# Patient Record
Sex: MALE | Race: WHITE | NOT HISPANIC OR LATINO | Employment: FULL TIME | URBAN - METROPOLITAN AREA
[De-identification: names, ages, dates, MRNs, and addresses within clinical notes are randomized per-mention and may not be internally consistent; named-entity substitution may affect disease eponyms.]

---

## 2023-04-05 ENCOUNTER — TELEPHONE (OUTPATIENT)
Dept: PRIMARY CARE | Facility: CLINIC | Age: 63
End: 2023-04-05

## 2023-04-05 LAB
ALANINE AMINOTRANSFERASE (SGPT) (U/L) IN SER/PLAS: 18 U/L (ref 10–52)
ALBUMIN (G/DL) IN SER/PLAS: 4.7 G/DL (ref 3.4–5)
ALBUMIN (MG/L) IN URINE: 10.1 MG/L
ALBUMIN/CREATININE (UG/MG) IN URINE: 19.4 UG/MG CRT (ref 0–30)
ALKALINE PHOSPHATASE (U/L) IN SER/PLAS: 50 U/L (ref 33–136)
ANION GAP IN SER/PLAS: 15 MMOL/L (ref 10–20)
APPEARANCE, URINE: CLEAR
ASPARTATE AMINOTRANSFERASE (SGOT) (U/L) IN SER/PLAS: 14 U/L (ref 9–39)
BILIRUBIN TOTAL (MG/DL) IN SER/PLAS: 0.7 MG/DL (ref 0–1.2)
BILIRUBIN, URINE: NEGATIVE
BLOOD, URINE: NEGATIVE
CALCIUM (MG/DL) IN SER/PLAS: 10.2 MG/DL (ref 8.6–10.6)
CARBON DIOXIDE, TOTAL (MMOL/L) IN SER/PLAS: 28 MMOL/L (ref 21–32)
CHLORIDE (MMOL/L) IN SER/PLAS: 100 MMOL/L (ref 98–107)
CHOLESTEROL (MG/DL) IN SER/PLAS: 130 MG/DL (ref 0–199)
CHOLESTEROL IN HDL (MG/DL) IN SER/PLAS: 52.2 MG/DL
CHOLESTEROL/HDL RATIO: 2.5
COLOR, URINE: ABNORMAL
CREATININE (MG/DL) IN SER/PLAS: 0.82 MG/DL (ref 0.5–1.3)
CREATININE (MG/DL) IN URINE: 52 MG/DL (ref 20–370)
ERYTHROCYTE DISTRIBUTION WIDTH (RATIO) BY AUTOMATED COUNT: 13.1 % (ref 11.5–14.5)
ERYTHROCYTE MEAN CORPUSCULAR HEMOGLOBIN CONCENTRATION (G/DL) BY AUTOMATED: 31.3 G/DL (ref 32–36)
ERYTHROCYTE MEAN CORPUSCULAR VOLUME (FL) BY AUTOMATED COUNT: 94 FL (ref 80–100)
ERYTHROCYTES (10*6/UL) IN BLOOD BY AUTOMATED COUNT: 5.7 X10E12/L (ref 4.5–5.9)
ESTIMATED AVERAGE GLUCOSE FOR HBA1C: 180 MG/DL
GFR MALE: >90 ML/MIN/1.73M2
GLUCOSE (MG/DL) IN SER/PLAS: 130 MG/DL (ref 74–99)
GLUCOSE, URINE: ABNORMAL MG/DL
HEMATOCRIT (%) IN BLOOD BY AUTOMATED COUNT: 53.3 % (ref 41–52)
HEMOGLOBIN (G/DL) IN BLOOD: 16.7 G/DL (ref 13.5–17.5)
HEMOGLOBIN A1C/HEMOGLOBIN TOTAL IN BLOOD: 7.9 %
KETONES, URINE: ABNORMAL MG/DL
LDL: 66 MG/DL (ref 0–99)
LEUKOCYTE ESTERASE, URINE: NEGATIVE
LEUKOCYTES (10*3/UL) IN BLOOD BY AUTOMATED COUNT: 7 X10E9/L (ref 4.4–11.3)
NITRITE, URINE: NEGATIVE
NRBC (PER 100 WBCS) BY AUTOMATED COUNT: 0 /100 WBC (ref 0–0)
PH, URINE: 5 (ref 5–8)
PLATELETS (10*3/UL) IN BLOOD AUTOMATED COUNT: 143 X10E9/L (ref 150–450)
POTASSIUM (MMOL/L) IN SER/PLAS: 4.7 MMOL/L (ref 3.5–5.3)
PROSTATE SPECIFIC AG (NG/ML) IN SER/PLAS: 0.54 NG/ML (ref 0–4)
PROTEIN TOTAL: 7.5 G/DL (ref 6.4–8.2)
PROTEIN, URINE: NEGATIVE MG/DL
SODIUM (MMOL/L) IN SER/PLAS: 138 MMOL/L (ref 136–145)
SPECIFIC GRAVITY, URINE: 1.03 (ref 1–1.03)
TRIGLYCERIDE (MG/DL) IN SER/PLAS: 61 MG/DL (ref 0–149)
UREA NITROGEN (MG/DL) IN SER/PLAS: 29 MG/DL (ref 6–23)
UROBILINOGEN, URINE: <2 MG/DL (ref 0–1.9)
VLDL: 12 MG/DL (ref 0–40)

## 2023-04-06 NOTE — TELEPHONE ENCOUNTER
----- Message from Tony Miranda MD sent at 4/5/2023  9:28 PM EDT -----  Low PLT count is stable at baseline. FBS and A1c are higher in uncontrolled diabetes range. Other labs look good.   ----- Message -----  From: [email or web address] - Lab Results In  Sent: 4/5/2023   8:43 PM EDT  To: Tony Miranda MD

## 2023-04-07 ENCOUNTER — OFFICE VISIT (OUTPATIENT)
Dept: PRIMARY CARE | Facility: CLINIC | Age: 63
End: 2023-04-07
Payer: COMMERCIAL

## 2023-04-07 VITALS
HEART RATE: 60 BPM | OXYGEN SATURATION: 95 % | RESPIRATION RATE: 12 BRPM | SYSTOLIC BLOOD PRESSURE: 136 MMHG | DIASTOLIC BLOOD PRESSURE: 73 MMHG | HEIGHT: 73 IN | BODY MASS INDEX: 29.95 KG/M2 | WEIGHT: 226 LBS | TEMPERATURE: 97.5 F

## 2023-04-07 DIAGNOSIS — I25.10 CORONARY ARTERY DISEASE INVOLVING NATIVE CORONARY ARTERY OF NATIVE HEART WITHOUT ANGINA PECTORIS: ICD-10-CM

## 2023-04-07 DIAGNOSIS — E78.2 MIXED DYSLIPIDEMIA: ICD-10-CM

## 2023-04-07 DIAGNOSIS — K21.9 GASTROESOPHAGEAL REFLUX DISEASE WITHOUT ESOPHAGITIS: ICD-10-CM

## 2023-04-07 DIAGNOSIS — I10 HTN (HYPERTENSION), BENIGN: ICD-10-CM

## 2023-04-07 DIAGNOSIS — G47.33 OSA ON CPAP: ICD-10-CM

## 2023-04-07 DIAGNOSIS — E11.69 DM TYPE 2 WITH DIABETIC DYSLIPIDEMIA (MULTI): Primary | ICD-10-CM

## 2023-04-07 DIAGNOSIS — E78.5 DM TYPE 2 WITH DIABETIC DYSLIPIDEMIA (MULTI): Primary | ICD-10-CM

## 2023-04-07 PROCEDURE — 1036F TOBACCO NON-USER: CPT | Performed by: FAMILY MEDICINE

## 2023-04-07 PROCEDURE — 99214 OFFICE O/P EST MOD 30 MIN: CPT | Performed by: FAMILY MEDICINE

## 2023-04-07 PROCEDURE — 3078F DIAST BP <80 MM HG: CPT | Performed by: FAMILY MEDICINE

## 2023-04-07 PROCEDURE — 3075F SYST BP GE 130 - 139MM HG: CPT | Performed by: FAMILY MEDICINE

## 2023-04-07 PROCEDURE — 3051F HG A1C>EQUAL 7.0%<8.0%: CPT | Performed by: FAMILY MEDICINE

## 2023-04-07 PROCEDURE — 4010F ACE/ARB THERAPY RXD/TAKEN: CPT | Performed by: FAMILY MEDICINE

## 2023-04-07 RX ORDER — METFORMIN HYDROCHLORIDE 500 MG/1
TABLET, EXTENDED RELEASE ORAL
COMMUNITY
End: 2023-04-07 | Stop reason: SDUPTHER

## 2023-04-07 RX ORDER — METFORMIN HYDROCHLORIDE 500 MG/1
2000 TABLET, EXTENDED RELEASE ORAL
Qty: 360 TABLET | Refills: 0 | Status: SHIPPED | OUTPATIENT
Start: 2023-04-07 | End: 2023-07-14 | Stop reason: SDUPTHER

## 2023-04-07 RX ORDER — OMEPRAZOLE 40 MG/1
CAPSULE, DELAYED RELEASE ORAL
COMMUNITY
Start: 2023-04-05 | End: 2023-04-07 | Stop reason: SDUPTHER

## 2023-04-07 RX ORDER — METOPROLOL TARTRATE 100 MG/1
100 TABLET ORAL 2 TIMES DAILY
Qty: 180 TABLET | Refills: 0 | Status: SHIPPED | OUTPATIENT
Start: 2023-04-07 | End: 2023-07-14 | Stop reason: SDUPTHER

## 2023-04-07 RX ORDER — LOSARTAN POTASSIUM 50 MG/1
50 TABLET ORAL DAILY
COMMUNITY
End: 2023-04-07 | Stop reason: SDUPTHER

## 2023-04-07 RX ORDER — BLOOD-GLUCOSE METER
EACH MISCELLANEOUS
COMMUNITY

## 2023-04-07 RX ORDER — METOPROLOL TARTRATE 100 MG/1
1 TABLET ORAL 2 TIMES DAILY
COMMUNITY
Start: 2022-06-30 | End: 2023-04-07 | Stop reason: SDUPTHER

## 2023-04-07 RX ORDER — ASPIRIN 81 MG/1
1 TABLET ORAL DAILY
COMMUNITY
Start: 2015-03-18

## 2023-04-07 RX ORDER — EMPAGLIFLOZIN 25 MG/1
25 TABLET, FILM COATED ORAL DAILY
Qty: 90 TABLET | Refills: 0 | Status: SHIPPED | OUTPATIENT
Start: 2023-04-07 | End: 2023-07-14 | Stop reason: SDUPTHER

## 2023-04-07 RX ORDER — LOSARTAN POTASSIUM 50 MG/1
50 TABLET ORAL DAILY
Qty: 90 TABLET | Refills: 0 | Status: SHIPPED | OUTPATIENT
Start: 2023-04-07 | End: 2023-07-14 | Stop reason: SDUPTHER

## 2023-04-07 RX ORDER — ROSUVASTATIN CALCIUM 20 MG/1
20 TABLET, COATED ORAL DAILY
Qty: 90 TABLET | Refills: 0 | Status: SHIPPED | OUTPATIENT
Start: 2023-04-07 | End: 2023-07-14 | Stop reason: SDUPTHER

## 2023-04-07 RX ORDER — EMPAGLIFLOZIN 25 MG/1
25 TABLET, FILM COATED ORAL DAILY
COMMUNITY
End: 2023-04-07 | Stop reason: SDUPTHER

## 2023-04-07 RX ORDER — ROSUVASTATIN CALCIUM 20 MG/1
20 TABLET, COATED ORAL DAILY
COMMUNITY
End: 2023-04-07 | Stop reason: SDUPTHER

## 2023-04-07 RX ORDER — HYDROCORTISONE 1 %
CREAM (GRAM) TOPICAL
COMMUNITY
Start: 2021-02-11

## 2023-04-07 RX ORDER — OMEPRAZOLE 40 MG/1
40 CAPSULE, DELAYED RELEASE ORAL
Qty: 90 CAPSULE | Refills: 0 | Status: SHIPPED | OUTPATIENT
Start: 2023-04-07 | End: 2023-07-14 | Stop reason: SDUPTHER

## 2023-04-07 ASSESSMENT — ENCOUNTER SYMPTOMS
COUGH: 0
BRUISES/BLEEDS EASILY: 0
SORE THROAT: 0
EYE REDNESS: 0
ABDOMINAL PAIN: 0
WEAKNESS: 0
CHILLS: 0
FATIGUE: 0
HEADACHES: 0
DYSURIA: 0
BACK PAIN: 0
FEVER: 0
DIZZINESS: 0
APPETITE CHANGE: 0
CONSTIPATION: 0
EYE PAIN: 0
NERVOUS/ANXIOUS: 0
ADENOPATHY: 0
ABDOMINAL DISTENTION: 0
SHORTNESS OF BREATH: 0
BLOOD IN STOOL: 0
DYSPHORIC MOOD: 0
CHEST TIGHTNESS: 0
ARTHRALGIAS: 0
DIFFICULTY URINATING: 0
DIARRHEA: 0

## 2023-04-07 NOTE — PROGRESS NOTES
"Subjective   Patient ID: Ryan Monreal is a 62 y.o. male who presents for Follow-up.    HPI     Review of Systems    Objective   /73   Pulse 60   Temp 36.4 °C (97.5 °F)   Resp 12   Ht 1.854 m (6' 1\")   Wt 103 kg (226 lb)   SpO2 95%   BMI 29.82 kg/m²     Physical Exam    Assessment/Plan          "

## 2023-04-07 NOTE — PROGRESS NOTES
Subjective   Patient ID: Ryan Monreal is a 62 y.o. male who presents for Follow-up.  Pt is here for f/u Type 2 diabetes.   Pt is usually following low carb diet, and is exercising 0 days per week, just getting back with plantar fasciitis recently  Taking Met, Jardiance.   1 x daily accucheks . Fasting readings are in 120-130s range. No low blood sugar readings have been encountered.  A1c 7.9  Eye exam is current  Pt does not  have foot pain, paresthesias, or numbness in feet. Foot checks daily - yes    .  Following with podiatry -  no    Denies vision changes, abdominal pain, excessive thirst, frequent urination.     Pt has chronic HTN, hx CAD. Had stress test yesterday with Dr Healy in [place].  Pt is taking Losartan. Tolerating well.  Exercising 0 days per week   Low sodium diet is usually being followed.   Is not monitoring home blood pressures.  Denies HA, vision changes or CP.     Pt has Dyslipidemia.   Lipid panel showed LDL 61.  Currently taking crestor  and is tolerating well without muscle pains or weakness.     For VÍCTOR pt is using CPAP. Tolerating well without issues. Daytime energy is good.     GERD is well controlled on Omeprazole . Pt is taking medication daily. Denies epigastric pain, nausea, heartburn or water brash.         Diabetic foot exam:   Left:  Filament test present  Right:  Filament test present      Review of Systems   Constitutional:  Negative for appetite change, chills, fatigue and fever.   HENT:  Negative for congestion, hearing loss and sore throat.    Eyes:  Negative for pain, redness and visual disturbance.   Respiratory:  Negative for cough, chest tightness and shortness of breath.    Cardiovascular:  Negative for chest pain and leg swelling.   Gastrointestinal:  Negative for abdominal distention, abdominal pain, blood in stool, constipation and diarrhea.   Genitourinary:  Negative for difficulty urinating and dysuria.   Musculoskeletal:  Negative for arthralgias and back pain.  "  Skin:  Negative for rash.   Neurological:  Negative for dizziness, weakness and headaches.   Hematological:  Negative for adenopathy. Does not bruise/bleed easily.   Psychiatric/Behavioral:  Negative for dysphoric mood. The patient is not nervous/anxious.        Objective   /73   Pulse 60   Temp 36.4 °C (97.5 °F)   Resp 12   Ht 1.854 m (6' 1\")   Wt 103 kg (226 lb)   SpO2 95%   BMI 29.82 kg/m²    Physical Exam  Constitutional:       General: He is not in acute distress.     Appearance: Normal appearance.   Cardiovascular:      Rate and Rhythm: Normal rate and regular rhythm.      Heart sounds: Normal heart sounds. No murmur heard.  Pulmonary:      Effort: Pulmonary effort is normal.      Breath sounds: Normal breath sounds.   Abdominal:      Palpations: Abdomen is soft.      Tenderness: There is no abdominal tenderness.   Neurological:      Mental Status: He is alert.   Psychiatric:         Mood and Affect: Mood normal.         Judgment: Judgment normal.           Assessment/Plan   Diagnoses and all orders for this visit:  DM type 2 with diabetic dyslipidemia (CMS/HCC) - A1c higher, getting back to exercising post plantar faciitis  HTN (hypertension), benign - well controlled, continue current med  Mixed dyslipidemia - doing well on statin, will monitor  Gastroesophageal reflux disease  - well controlled, continu Omeprazole at lowest effective dose  VÍCTOR on CPAP - doing well, continue   Coronary artery disease - stable, recent normal stress test, will get records from cardiology       "

## 2023-06-19 ENCOUNTER — TELEPHONE (OUTPATIENT)
Dept: PRIMARY CARE | Facility: CLINIC | Age: 63
End: 2023-06-19

## 2023-06-19 NOTE — TELEPHONE ENCOUNTER
[facility] and he states his he went of vacation and came home and dig in garden then next day his hip and knee is bother him. At the clinic they gave him steroids and muscle relaxer for 6 days and he is not feeling any better. He would like to come in to see you to make sure everything is ok this week he prefer on Thursday or anytime this week he will make adjustment. It hard for him to stand up for work and daily activities and hard to lay comfortable to sleep. Please advise

## 2023-06-22 ENCOUNTER — OFFICE VISIT (OUTPATIENT)
Dept: PRIMARY CARE | Facility: CLINIC | Age: 63
End: 2023-06-22
Payer: COMMERCIAL

## 2023-06-22 VITALS
BODY MASS INDEX: 30.35 KG/M2 | HEIGHT: 73 IN | SYSTOLIC BLOOD PRESSURE: 154 MMHG | DIASTOLIC BLOOD PRESSURE: 88 MMHG | RESPIRATION RATE: 16 BRPM | WEIGHT: 229 LBS | OXYGEN SATURATION: 96 %

## 2023-06-22 DIAGNOSIS — S39.012A STRAIN OF LUMBAR REGION, INITIAL ENCOUNTER: Primary | ICD-10-CM

## 2023-06-22 DIAGNOSIS — M70.61 TROCHANTERIC BURSITIS OF RIGHT HIP: ICD-10-CM

## 2023-06-22 DIAGNOSIS — S86.911A KNEE STRAIN, RIGHT, INITIAL ENCOUNTER: ICD-10-CM

## 2023-06-22 PROBLEM — M20.10 HALLUX VALGUS WITH BUNIONS: Status: ACTIVE | Noted: 2023-06-22

## 2023-06-22 PROBLEM — R94.39 ABNORMAL MYOCARDIAL PERFUSION STUDY: Status: ACTIVE | Noted: 2017-03-29

## 2023-06-22 PROBLEM — J06.9 VIRAL URI WITH COUGH: Status: ACTIVE | Noted: 2023-06-22

## 2023-06-22 PROBLEM — H90.3 SENSORINEURAL HEARING LOSS (SNHL), BILATERAL: Status: ACTIVE | Noted: 2023-06-22

## 2023-06-22 PROBLEM — I25.2 OLD ANTERIOR MYOCARDIAL INFARCTION: Status: ACTIVE | Noted: 2017-02-27

## 2023-06-22 PROBLEM — N13.5 URETERAL STRICTURE, RIGHT: Status: ACTIVE | Noted: 2023-06-22

## 2023-06-22 PROBLEM — J34.2 DEVIATED SEPTUM: Status: ACTIVE | Noted: 2023-06-22

## 2023-06-22 PROBLEM — M25.571 RIGHT ANKLE PAIN: Status: ACTIVE | Noted: 2023-06-22

## 2023-06-22 PROBLEM — K21.9 GERD WITHOUT ESOPHAGITIS: Status: ACTIVE | Noted: 2023-06-22

## 2023-06-22 PROBLEM — H91.90 HEARING LOSS: Status: ACTIVE | Noted: 2023-06-22

## 2023-06-22 PROBLEM — Z95.5 PRESENCE OF BARE METAL STENT IN LAD CORONARY ARTERY: Status: ACTIVE | Noted: 2017-02-27

## 2023-06-22 PROBLEM — M25.561 RIGHT KNEE PAIN: Status: ACTIVE | Noted: 2023-06-22

## 2023-06-22 PROBLEM — R10.9 ABDOMINAL PAIN: Status: ACTIVE | Noted: 2023-06-22

## 2023-06-22 PROBLEM — M54.31 SCIATICA OF RIGHT SIDE: Status: ACTIVE | Noted: 2023-06-22

## 2023-06-22 PROBLEM — S93.401A RIGHT ANKLE SPRAIN: Status: ACTIVE | Noted: 2023-06-22

## 2023-06-22 PROBLEM — B35.1 ONYCHOMYCOSIS OF TOENAIL: Status: ACTIVE | Noted: 2023-06-22

## 2023-06-22 PROBLEM — S66.911A STRAIN OF RIGHT WRIST: Status: ACTIVE | Noted: 2023-06-22

## 2023-06-22 PROBLEM — R10.9 RT FLANK PAIN: Status: ACTIVE | Noted: 2023-06-22

## 2023-06-22 PROBLEM — K92.1 HEMATOCHEZIA: Status: ACTIVE | Noted: 2023-06-22

## 2023-06-22 PROBLEM — E66.9 OBESITY (BMI 30-39.9): Status: ACTIVE | Noted: 2023-06-22

## 2023-06-22 PROBLEM — S66.911A STRAIN OF RIGHT HAND: Status: ACTIVE | Noted: 2023-06-22

## 2023-06-22 PROBLEM — I25.10 ATHEROSCLEROSIS OF NATIVE CORONARY ARTERY OF NATIVE HEART WITHOUT ANGINA PECTORIS: Status: ACTIVE | Noted: 2017-02-27

## 2023-06-22 PROBLEM — M72.2 PLANTAR FASCIITIS, RIGHT: Status: ACTIVE | Noted: 2023-06-22

## 2023-06-22 PROBLEM — I10 BENIGN HYPERTENSION: Status: ACTIVE | Noted: 2023-06-22

## 2023-06-22 PROBLEM — G47.39 TREATMENT-EMERGENT CENTRAL SLEEP APNEA: Status: ACTIVE | Noted: 2023-06-22

## 2023-06-22 PROBLEM — M21.619 HALLUX VALGUS WITH BUNIONS: Status: ACTIVE | Noted: 2023-06-22

## 2023-06-22 PROBLEM — K64.8 INTERNAL HEMORRHOIDS: Status: ACTIVE | Noted: 2023-06-22

## 2023-06-22 PROBLEM — E78.5 DYSLIPIDEMIA: Status: ACTIVE | Noted: 2023-06-22

## 2023-06-22 PROBLEM — Z20.822 SUSPECTED COVID-19 VIRUS INFECTION: Status: ACTIVE | Noted: 2023-06-22

## 2023-06-22 PROBLEM — N20.0 KIDNEY STONES: Status: ACTIVE | Noted: 2023-06-22

## 2023-06-22 PROBLEM — Z95.5 PRESENCE OF CORONARY ANGIOPLASTY IMPLANT AND GRAFT: Status: ACTIVE | Noted: 2017-02-27

## 2023-06-22 PROCEDURE — 3077F SYST BP >= 140 MM HG: CPT | Performed by: FAMILY MEDICINE

## 2023-06-22 PROCEDURE — 3079F DIAST BP 80-89 MM HG: CPT | Performed by: FAMILY MEDICINE

## 2023-06-22 PROCEDURE — 99213 OFFICE O/P EST LOW 20 MIN: CPT | Performed by: FAMILY MEDICINE

## 2023-06-22 PROCEDURE — 1036F TOBACCO NON-USER: CPT | Performed by: FAMILY MEDICINE

## 2023-06-22 PROCEDURE — 4010F ACE/ARB THERAPY RXD/TAKEN: CPT | Performed by: FAMILY MEDICINE

## 2023-06-22 PROCEDURE — 3051F HG A1C>EQUAL 7.0%<8.0%: CPT | Performed by: FAMILY MEDICINE

## 2023-06-22 RX ORDER — BACLOFEN 10 MG/1
10-20 TABLET ORAL 3 TIMES DAILY
Qty: 60 TABLET | Refills: 1 | Status: SHIPPED | OUTPATIENT
Start: 2023-06-22 | End: 2023-07-14 | Stop reason: SDUPTHER

## 2023-06-22 RX ORDER — METHOCARBAMOL 500 MG/1
1 TABLET, FILM COATED ORAL 3 TIMES DAILY
COMMUNITY
Start: 2023-06-13 | End: 2023-07-14

## 2023-06-22 RX ORDER — NAPROXEN 500 MG/1
500 TABLET ORAL 2 TIMES DAILY PRN
Qty: 60 TABLET | Refills: 1 | Status: SHIPPED | OUTPATIENT
Start: 2023-06-22 | End: 2023-07-14 | Stop reason: SDUPTHER

## 2023-06-22 RX ORDER — POTASSIUM CITRATE 15 MEQ/1
1 TABLET, EXTENDED RELEASE ORAL
COMMUNITY
Start: 2017-02-24 | End: 2023-06-22 | Stop reason: ALTCHOICE

## 2023-06-22 ASSESSMENT — ENCOUNTER SYMPTOMS
APPETITE CHANGE: 0
CHILLS: 0
SHORTNESS OF BREATH: 0
NERVOUS/ANXIOUS: 0
EYE PAIN: 0
FATIGUE: 0
BLOOD IN STOOL: 0
DYSPHORIC MOOD: 0
CONSTIPATION: 0
CHEST TIGHTNESS: 0
ADENOPATHY: 0
SORE THROAT: 0
HEADACHES: 0
COUGH: 0
WEAKNESS: 0
DYSURIA: 0
EYE REDNESS: 0
DIFFICULTY URINATING: 0
FEVER: 0
DIZZINESS: 0
DIARRHEA: 0
ABDOMINAL PAIN: 0
ABDOMINAL DISTENTION: 0
ARTHRALGIAS: 0
BACK PAIN: 0
LEG PAIN: 1
BRUISES/BLEEDS EASILY: 0

## 2023-06-22 NOTE — PROGRESS NOTES
"Subjective   Patient ID: Ryan Monreal is a 63 y.o. male who presents for Leg Pain.    HPI     Review of Systems    Objective   /88   Resp 16   Ht 1.854 m (6' 1\")   Wt 104 kg (229 lb)   SpO2 96%   BMI 30.21 kg/m²     Physical Exam    Assessment/Plan          "

## 2023-06-22 NOTE — PROGRESS NOTES
"Subjective   Patient ID: Ryan Monreal is a 63 y.o. male who presents for Leg Pain.  Pt has R lower back, R lateral, R knee pain.  Onset was 2 weeks ago after tilling the garden.    Pt reports symptoms are unchanged.   It worsens with lying on R side and changing.   It occurs with frequency of constant .   Pt has tried Medrol and Robaxin from [facility] for treatment without improvement.     Leg Pain         Review of Systems   Constitutional:  Negative for appetite change, chills, fatigue and fever.   HENT:  Negative for congestion, hearing loss and sore throat.    Eyes:  Negative for pain, redness and visual disturbance.   Respiratory:  Negative for cough, chest tightness and shortness of breath.    Cardiovascular:  Negative for chest pain and leg swelling.   Gastrointestinal:  Negative for abdominal distention, abdominal pain, blood in stool, constipation and diarrhea.   Genitourinary:  Negative for difficulty urinating and dysuria.   Musculoskeletal:  Negative for arthralgias and back pain.   Skin:  Negative for rash.   Neurological:  Negative for dizziness, weakness and headaches.   Hematological:  Negative for adenopathy. Does not bruise/bleed easily.   Psychiatric/Behavioral:  Negative for dysphoric mood. The patient is not nervous/anxious.        Objective   /88   Resp 16   Ht 1.854 m (6' 1\")   Wt 104 kg (229 lb)   SpO2 96%   BMI 30.21 kg/m²    Physical Exam  Constitutional:       General: He is not in acute distress.     Appearance: Normal appearance.   Cardiovascular:      Rate and Rhythm: Normal rate and regular rhythm.      Heart sounds: Normal heart sounds. No murmur heard.  Pulmonary:      Effort: Pulmonary effort is normal.      Breath sounds: Normal breath sounds.   Abdominal:      Palpations: Abdomen is soft.      Tenderness: There is no abdominal tenderness.   Musculoskeletal:      Comments: Lumbar spine has no tenderness or step offs. SI joints are nontender bilaterally. SLR is " negative bilaterally. Strength and tone are normal in the LEs and there is no saddle anesthesia.     R hip is nontender anteriorly, + tenderness laterally over trochanteric bursa. There is no pain or crepitus with hip flexion, abduction.  TAM test causes lateral hip pain.    R Knee - mild gross swelling currently. Mild tenderness over joint line and LCL.   No effusion. Lachmans, drawers and MCL/LCL all without laxity.    Neurological:      Mental Status: He is alert.   Psychiatric:         Mood and Affect: Mood normal.         Judgment: Judgment normal.           Assessment/Plan   Diagnoses and all orders for this visit:  Strain of lumbar region, initial encounter/Trochanteric bursitis of right hip/Knee strain, right, initial encounter - recommend rest, good hydration, gentle stetching, icing hip and knee 20mins 3x daily, and giving Rx for Naproxen and Baclofen to take as needed.  -     naproxen (Naprosyn) 500 mg tablet; Take 1 tablet (500 mg) by mouth 2 times a day as needed for mild pain (1 - 3) (pain).  -     baclofen (Lioresal) 10 mg tablet; Take 1-2 tablets (10-20 mg) by mouth 3 times a day.

## 2023-07-07 ENCOUNTER — APPOINTMENT (OUTPATIENT)
Dept: LAB | Facility: LAB | Age: 63
End: 2023-07-07
Payer: COMMERCIAL

## 2023-07-07 LAB
ESTIMATED AVERAGE GLUCOSE FOR HBA1C: 197 MG/DL
HEMOGLOBIN A1C/HEMOGLOBIN TOTAL IN BLOOD: 8.5 %

## 2023-07-12 ENCOUNTER — TELEPHONE (OUTPATIENT)
Dept: PRIMARY CARE | Facility: CLINIC | Age: 63
End: 2023-07-12
Payer: COMMERCIAL

## 2023-07-12 NOTE — TELEPHONE ENCOUNTER
----- Message from Tony Miranda MD sent at 7/8/2023  9:16 PM EDT -----  A1c is higher in uncontrolled diabetic range  ----- Message -----  From: Karyn Harris - Lab Results In  Sent: 7/7/2023   9:38 PM EDT  To: Tony Miranda MD

## 2023-07-14 ENCOUNTER — OFFICE VISIT (OUTPATIENT)
Dept: PRIMARY CARE | Facility: CLINIC | Age: 63
End: 2023-07-14
Payer: COMMERCIAL

## 2023-07-14 VITALS
WEIGHT: 232 LBS | HEIGHT: 73 IN | RESPIRATION RATE: 12 BRPM | HEART RATE: 49 BPM | OXYGEN SATURATION: 96 % | SYSTOLIC BLOOD PRESSURE: 124 MMHG | DIASTOLIC BLOOD PRESSURE: 68 MMHG | BODY MASS INDEX: 30.75 KG/M2

## 2023-07-14 DIAGNOSIS — G89.29 CHRONIC PAIN OF RIGHT KNEE: ICD-10-CM

## 2023-07-14 DIAGNOSIS — E78.2 MIXED DYSLIPIDEMIA: ICD-10-CM

## 2023-07-14 DIAGNOSIS — K21.9 GASTROESOPHAGEAL REFLUX DISEASE WITHOUT ESOPHAGITIS: ICD-10-CM

## 2023-07-14 DIAGNOSIS — I10 HTN (HYPERTENSION), BENIGN: ICD-10-CM

## 2023-07-14 DIAGNOSIS — M25.561 CHRONIC PAIN OF RIGHT KNEE: ICD-10-CM

## 2023-07-14 DIAGNOSIS — S86.911A KNEE STRAIN, RIGHT, INITIAL ENCOUNTER: ICD-10-CM

## 2023-07-14 DIAGNOSIS — E11.69 DM TYPE 2 WITH DIABETIC DYSLIPIDEMIA (MULTI): Primary | ICD-10-CM

## 2023-07-14 DIAGNOSIS — E78.5 DM TYPE 2 WITH DIABETIC DYSLIPIDEMIA (MULTI): Primary | ICD-10-CM

## 2023-07-14 DIAGNOSIS — M70.61 TROCHANTERIC BURSITIS OF RIGHT HIP: ICD-10-CM

## 2023-07-14 DIAGNOSIS — S39.012A STRAIN OF LUMBAR REGION, INITIAL ENCOUNTER: ICD-10-CM

## 2023-07-14 PROCEDURE — 4010F ACE/ARB THERAPY RXD/TAKEN: CPT | Performed by: FAMILY MEDICINE

## 2023-07-14 PROCEDURE — 3074F SYST BP LT 130 MM HG: CPT | Performed by: FAMILY MEDICINE

## 2023-07-14 PROCEDURE — 3078F DIAST BP <80 MM HG: CPT | Performed by: FAMILY MEDICINE

## 2023-07-14 PROCEDURE — 3052F HG A1C>EQUAL 8.0%<EQUAL 9.0%: CPT | Performed by: FAMILY MEDICINE

## 2023-07-14 PROCEDURE — 1036F TOBACCO NON-USER: CPT | Performed by: FAMILY MEDICINE

## 2023-07-14 PROCEDURE — 99213 OFFICE O/P EST LOW 20 MIN: CPT | Performed by: FAMILY MEDICINE

## 2023-07-14 RX ORDER — OMEPRAZOLE 40 MG/1
40 CAPSULE, DELAYED RELEASE ORAL
Qty: 90 CAPSULE | Refills: 0 | Status: SHIPPED | OUTPATIENT
Start: 2023-07-14 | End: 2023-10-20 | Stop reason: SDUPTHER

## 2023-07-14 RX ORDER — BACLOFEN 10 MG/1
10-20 TABLET ORAL 3 TIMES DAILY
Qty: 60 TABLET | Refills: 2 | Status: SHIPPED | OUTPATIENT
Start: 2023-07-14 | End: 2024-07-13

## 2023-07-14 RX ORDER — LOSARTAN POTASSIUM 50 MG/1
50 TABLET ORAL DAILY
Qty: 90 TABLET | Refills: 0 | Status: SHIPPED | OUTPATIENT
Start: 2023-07-14 | End: 2023-10-20 | Stop reason: SDUPTHER

## 2023-07-14 RX ORDER — NAPROXEN 500 MG/1
500 TABLET ORAL 2 TIMES DAILY PRN
Qty: 60 TABLET | Refills: 2 | Status: SHIPPED | OUTPATIENT
Start: 2023-07-14 | End: 2023-10-12

## 2023-07-14 RX ORDER — EMPAGLIFLOZIN 25 MG/1
25 TABLET, FILM COATED ORAL DAILY
Qty: 90 TABLET | Refills: 0 | Status: SHIPPED | OUTPATIENT
Start: 2023-07-14 | End: 2023-10-20 | Stop reason: SDUPTHER

## 2023-07-14 RX ORDER — METFORMIN HYDROCHLORIDE 500 MG/1
2000 TABLET, EXTENDED RELEASE ORAL
Qty: 360 TABLET | Refills: 0 | Status: SHIPPED | OUTPATIENT
Start: 2023-07-14 | End: 2023-10-13 | Stop reason: SDUPTHER

## 2023-07-14 RX ORDER — METOPROLOL TARTRATE 100 MG/1
100 TABLET ORAL 2 TIMES DAILY
Qty: 180 TABLET | Refills: 0 | Status: SHIPPED | OUTPATIENT
Start: 2023-07-14 | End: 2023-10-20 | Stop reason: SDUPTHER

## 2023-07-14 RX ORDER — ROSUVASTATIN CALCIUM 20 MG/1
20 TABLET, COATED ORAL DAILY
Qty: 90 TABLET | Refills: 0 | Status: SHIPPED | OUTPATIENT
Start: 2023-07-14 | End: 2023-10-20 | Stop reason: SDUPTHER

## 2023-07-14 ASSESSMENT — ENCOUNTER SYMPTOMS
FATIGUE: 0
APPETITE CHANGE: 0
ARTHRALGIAS: 1
FEVER: 0
WEAKNESS: 0
ABDOMINAL PAIN: 0
SHORTNESS OF BREATH: 0
CHEST TIGHTNESS: 0
EYE REDNESS: 0
SORE THROAT: 0
ABDOMINAL DISTENTION: 0
CONSTIPATION: 0
CHILLS: 0
BLOOD IN STOOL: 0
ADENOPATHY: 0
DYSURIA: 0
DIFFICULTY URINATING: 0
NERVOUS/ANXIOUS: 0
BRUISES/BLEEDS EASILY: 0
EYE PAIN: 0
DIZZINESS: 0
DYSPHORIC MOOD: 0
BACK PAIN: 0
COUGH: 0
HEADACHES: 0
DIARRHEA: 0

## 2023-07-14 NOTE — PROGRESS NOTES
"Subjective   Patient ID: Ryan Monreal is a 63 y.o. male who presents for Follow-up.  Pt is here for f/u Type 2 diabetes.   Pt is not following low carb diet, and is exercising 0 days per week due to R hip / knee pain. He is taking Baclofen and Naproxen with some relief but would like to get another knee injection.  Taking Metformin, Jardiance.   1 x daily accucheks . Fasting readings are in 110-130s range. No low blood sugar readings have been encountered.  A1c 8.5 up from 7.9, previously well controlled when exercising  Eye exam is current  Pt does not  have foot pain, paresthesias, or numbness in feet. Foot checks daily - yes.  Following with podiatry -  no  Denies vision changes, abdominal pain, excessive thirst, frequent urination.           Review of Systems   Constitutional:  Negative for appetite change, chills, fatigue and fever.   HENT:  Negative for congestion, hearing loss and sore throat.    Eyes:  Negative for pain, redness and visual disturbance.   Respiratory:  Negative for cough, chest tightness and shortness of breath.    Cardiovascular:  Negative for chest pain and leg swelling.   Gastrointestinal:  Negative for abdominal distention, abdominal pain, blood in stool, constipation and diarrhea.   Genitourinary:  Negative for difficulty urinating and dysuria.   Musculoskeletal:  Positive for arthralgias. Negative for back pain.   Skin:  Negative for rash.   Neurological:  Negative for dizziness, weakness and headaches.   Hematological:  Negative for adenopathy. Does not bruise/bleed easily.   Psychiatric/Behavioral:  Negative for dysphoric mood. The patient is not nervous/anxious.        Objective   /68   Pulse (!) 49   Resp 12   Ht 1.854 m (6' 1\")   Wt 105 kg (232 lb)   SpO2 96%   BMI 30.61 kg/m²    Physical Exam  Constitutional:       General: He is not in acute distress.     Appearance: Normal appearance.   Cardiovascular:      Rate and Rhythm: Normal rate and regular rhythm.      " Heart sounds: Normal heart sounds. No murmur heard.  Pulmonary:      Effort: Pulmonary effort is normal.      Breath sounds: Normal breath sounds.   Abdominal:      Palpations: Abdomen is soft.      Tenderness: There is no abdominal tenderness.   Neurological:      Mental Status: He is alert.   Psychiatric:         Mood and Affect: Mood normal.         Judgment: Judgment normal.           Assessment/Plan   Diagnoses and all orders for this visit:  DM type 2 with diabetic dyslipidemia (CMS/HCC) - A1c higher since unable to exercise. Need to focus on diet for now ( low carb/low sugar). Discussed adjusting medicine but he'd like to keep the same for now.  Trochanteric bursitis of right hip/Chronic pain of right knee - continue icing, Naproxen/Baclofen as needed - referring to orthopedics  Follow up in

## 2023-07-14 NOTE — PROGRESS NOTES
"Subjective   Patient ID: Ryan Monreal is a 63 y.o. male who presents for Follow-up.    HPI     Review of Systems    Objective   /68   Pulse (!) 49   Resp 12   Ht 1.854 m (6' 1\")   Wt 105 kg (232 lb)   SpO2 96%   BMI 30.61 kg/m²     Physical Exam    Assessment/Plan          "

## 2023-10-06 ENCOUNTER — TREATMENT (OUTPATIENT)
Dept: PHYSICAL THERAPY | Facility: CLINIC | Age: 63
End: 2023-10-06
Payer: COMMERCIAL

## 2023-10-06 DIAGNOSIS — M54.31 SCIATICA OF RIGHT SIDE: Primary | ICD-10-CM

## 2023-10-06 PROBLEM — M43.17 SPONDYLOLISTHESIS OF LUMBOSACRAL REGION: Status: ACTIVE | Noted: 2023-10-06

## 2023-10-06 PROCEDURE — 97110 THERAPEUTIC EXERCISES: CPT | Mod: GP

## 2023-10-06 PROCEDURE — 97112 NEUROMUSCULAR REEDUCATION: CPT | Mod: GP

## 2023-10-06 NOTE — PROGRESS NOTES
Physical Therapy    Physical Therapy Treatment    Patient Name: Ryan Monreal  MRN: [id number]  Today's Date: 10/6/2023  Time Calculation  Start Time: 0745  Stop Time: 0825  Time Calculation (min): 40 min      Assessment:    skilled intervention:  Reasmt, education for HEP    Has progressed well with dec pain and improved ROM/strength/fucntion and is agreeable with discharge at this time    Plan:     Discharge to [facility] for report period 9/1/23 -10/6/23  Current Problem  1. Sciatica of right side            Subjective     Visit #:5    General: mild re-injury earlier this week which has resolved  Overall pain dec close to 100%    Functional Progress:    Sleeping well  Walking tolerance WNL    Pain  Pain assessment 0-10  Pain score: 0  Pain location:      Insurance:  -authorization required: yes  -number of visits authorized  -authorization/ certification dates: 9/5/23-11/3/23    Compliant with [facility]:  yes    Understanding of HEP: WNL    Pain  Pain assessment 0-10  Pain score:  Pain location:      Objective       Therapeutic Exercise  25 minutes  2 units  see below  **indicates new exercises or progression  NP=not performed    Neuromuscular Re-education:  15 minutes  1 units  See below  **indicates new exercises or progression  NP=not performed    Therapeutic Activity:        Manual  Minutes  Units    Modalities  Timed minutes  Units  Untimed minutes   Units      Gait    Other     Exercise Log:     nustep L3 x 5'   SKC 10x   DKC 10x  LTR 10x  supine hamstring stretch 10 sec 5x   supine piriformis stretch 10 sec 5x  seated lumbar flexion stretch 10 sec 5x    pelvic tilts 10x   isometric hip flexion 10x  isometric hip adduction 10x   isometric hip abduction blue 10x   mid doorway stretch 10 sec 10x   Swiss ball stretches F/B 5 sec 5x   cable rows 15# 10x2   DLS flex/ext blue 10x2   DLS abdominal pull downs 3 directions 10 x 2  blue  DLS paloff press 10 x 2     Ortho   Lumbar ROM    FB: 90%  BB:NT  RSB: 100%   LSB:  100%    Flexibility     hamstrings B 80  piriformis: R min tight L end range tightness    Strength  BLE 5/5  abdominals: 5-  back extensors bridge 100%    Outcome Measures:  Other Measures  Oswestry Disablity Index (JANY): 4%        OP EDUCATION: review of HEP and progression  Issued black tband for progression of HEP       Goals:    LTG   I HEP (met)  improve functional outcome score to indicate improved functional mobility (met)  dec pain 50-75% on pain scale with improved sleep (met)  inc LE flexibility WFL (met)  inc core strength 5/5 with improved walking tolerance (met)  inc posture awareness (met)  inc lumbar ROM WFL (met)

## 2023-10-13 ENCOUNTER — APPOINTMENT (OUTPATIENT)
Dept: PHYSICAL THERAPY | Facility: CLINIC | Age: 63
End: 2023-10-13
Payer: COMMERCIAL

## 2023-10-13 DIAGNOSIS — E78.5 DM TYPE 2 WITH DIABETIC DYSLIPIDEMIA (MULTI): ICD-10-CM

## 2023-10-13 DIAGNOSIS — E11.69 DM TYPE 2 WITH DIABETIC DYSLIPIDEMIA (MULTI): ICD-10-CM

## 2023-10-13 RX ORDER — METFORMIN HYDROCHLORIDE 500 MG/1
2000 TABLET, EXTENDED RELEASE ORAL
Qty: 120 TABLET | Refills: 0 | Status: SHIPPED | OUTPATIENT
Start: 2023-10-13 | End: 2023-11-14

## 2023-10-19 ENCOUNTER — LAB (OUTPATIENT)
Dept: LAB | Facility: LAB | Age: 63
End: 2023-10-19
Payer: COMMERCIAL

## 2023-10-19 DIAGNOSIS — E11.69 DM TYPE 2 WITH DIABETIC DYSLIPIDEMIA (MULTI): ICD-10-CM

## 2023-10-19 DIAGNOSIS — E78.5 DM TYPE 2 WITH DIABETIC DYSLIPIDEMIA (MULTI): ICD-10-CM

## 2023-10-19 DIAGNOSIS — E78.2 MIXED DYSLIPIDEMIA: ICD-10-CM

## 2023-10-19 LAB
ALBUMIN SERPL BCP-MCNC: 4.5 G/DL (ref 3.4–5)
ALP SERPL-CCNC: 46 U/L (ref 33–136)
ALT SERPL W P-5'-P-CCNC: 16 U/L (ref 10–52)
ANION GAP SERPL CALC-SCNC: 14 MMOL/L (ref 10–20)
AST SERPL W P-5'-P-CCNC: 13 U/L (ref 9–39)
BILIRUB SERPL-MCNC: 0.6 MG/DL (ref 0–1.2)
BUN SERPL-MCNC: 19 MG/DL (ref 6–23)
CALCIUM SERPL-MCNC: 9.9 MG/DL (ref 8.6–10.6)
CHLORIDE SERPL-SCNC: 105 MMOL/L (ref 98–107)
CHOLEST SERPL-MCNC: 128 MG/DL (ref 0–199)
CHOLESTEROL/HDL RATIO: 3
CO2 SERPL-SCNC: 24 MMOL/L (ref 21–32)
CREAT SERPL-MCNC: 0.63 MG/DL (ref 0.5–1.3)
CREAT UR-MCNC: 59.3 MG/DL (ref 20–370)
EST. AVERAGE GLUCOSE BLD GHB EST-MCNC: 180 MG/DL
GFR SERPL CREATININE-BSD FRML MDRD: >90 ML/MIN/1.73M*2
GLUCOSE SERPL-MCNC: 132 MG/DL (ref 74–99)
HBA1C MFR BLD: 7.9 %
HDLC SERPL-MCNC: 43 MG/DL
LDLC SERPL CALC-MCNC: 70 MG/DL
MICROALBUMIN UR-MCNC: 13.9 MG/L
MICROALBUMIN/CREAT UR: 23.4 UG/MG CREAT
NON HDL CHOLESTEROL: 85 MG/DL (ref 0–149)
POTASSIUM SERPL-SCNC: 4.4 MMOL/L (ref 3.5–5.3)
PROT SERPL-MCNC: 6.9 G/DL (ref 6.4–8.2)
SODIUM SERPL-SCNC: 139 MMOL/L (ref 136–145)
TRIGL SERPL-MCNC: 73 MG/DL (ref 0–149)
VLDL: 15 MG/DL (ref 0–40)

## 2023-10-19 PROCEDURE — 80053 COMPREHEN METABOLIC PANEL: CPT

## 2023-10-19 PROCEDURE — 36415 COLL VENOUS BLD VENIPUNCTURE: CPT

## 2023-10-19 PROCEDURE — 80061 LIPID PANEL: CPT

## 2023-10-19 PROCEDURE — 82570 ASSAY OF URINE CREATININE: CPT

## 2023-10-19 PROCEDURE — 82043 UR ALBUMIN QUANTITATIVE: CPT

## 2023-10-19 PROCEDURE — 83036 HEMOGLOBIN GLYCOSYLATED A1C: CPT

## 2023-10-20 ENCOUNTER — OFFICE VISIT (OUTPATIENT)
Dept: PRIMARY CARE | Facility: CLINIC | Age: 63
End: 2023-10-20
Payer: COMMERCIAL

## 2023-10-20 VITALS
BODY MASS INDEX: 30.48 KG/M2 | HEIGHT: 73 IN | OXYGEN SATURATION: 98 % | SYSTOLIC BLOOD PRESSURE: 134 MMHG | DIASTOLIC BLOOD PRESSURE: 86 MMHG | RESPIRATION RATE: 12 BRPM | HEART RATE: 60 BPM | WEIGHT: 230 LBS

## 2023-10-20 DIAGNOSIS — I10 HTN (HYPERTENSION), BENIGN: ICD-10-CM

## 2023-10-20 DIAGNOSIS — E78.5 DM TYPE 2 WITH DIABETIC DYSLIPIDEMIA (MULTI): Primary | ICD-10-CM

## 2023-10-20 DIAGNOSIS — K21.9 GASTROESOPHAGEAL REFLUX DISEASE WITHOUT ESOPHAGITIS: ICD-10-CM

## 2023-10-20 DIAGNOSIS — M43.17 SPONDYLOLISTHESIS OF LUMBOSACRAL REGION: ICD-10-CM

## 2023-10-20 DIAGNOSIS — M54.31 SCIATICA OF RIGHT SIDE: ICD-10-CM

## 2023-10-20 DIAGNOSIS — E11.69 DM TYPE 2 WITH DIABETIC DYSLIPIDEMIA (MULTI): Primary | ICD-10-CM

## 2023-10-20 DIAGNOSIS — E78.2 MIXED DYSLIPIDEMIA: ICD-10-CM

## 2023-10-20 PROCEDURE — 90686 IIV4 VACC NO PRSV 0.5 ML IM: CPT | Performed by: FAMILY MEDICINE

## 2023-10-20 PROCEDURE — 4010F ACE/ARB THERAPY RXD/TAKEN: CPT | Performed by: FAMILY MEDICINE

## 2023-10-20 PROCEDURE — 99213 OFFICE O/P EST LOW 20 MIN: CPT | Performed by: FAMILY MEDICINE

## 2023-10-20 PROCEDURE — 90677 PCV20 VACCINE IM: CPT | Performed by: FAMILY MEDICINE

## 2023-10-20 PROCEDURE — 3061F NEG MICROALBUMINURIA REV: CPT | Performed by: FAMILY MEDICINE

## 2023-10-20 PROCEDURE — 3079F DIAST BP 80-89 MM HG: CPT | Performed by: FAMILY MEDICINE

## 2023-10-20 PROCEDURE — 3075F SYST BP GE 130 - 139MM HG: CPT | Performed by: FAMILY MEDICINE

## 2023-10-20 PROCEDURE — 90471 IMMUNIZATION ADMIN: CPT | Performed by: FAMILY MEDICINE

## 2023-10-20 PROCEDURE — 1036F TOBACCO NON-USER: CPT | Performed by: FAMILY MEDICINE

## 2023-10-20 PROCEDURE — 3048F LDL-C <100 MG/DL: CPT | Performed by: FAMILY MEDICINE

## 2023-10-20 PROCEDURE — 90472 IMMUNIZATION ADMIN EACH ADD: CPT | Performed by: FAMILY MEDICINE

## 2023-10-20 PROCEDURE — 3051F HG A1C>EQUAL 7.0%<8.0%: CPT | Performed by: FAMILY MEDICINE

## 2023-10-20 RX ORDER — OMEPRAZOLE 40 MG/1
40 CAPSULE, DELAYED RELEASE ORAL
Qty: 90 CAPSULE | Refills: 0 | Status: SHIPPED | OUTPATIENT
Start: 2023-10-20 | End: 2024-01-26 | Stop reason: SDUPTHER

## 2023-10-20 RX ORDER — EMPAGLIFLOZIN 25 MG/1
25 TABLET, FILM COATED ORAL DAILY
Qty: 90 TABLET | Refills: 0 | Status: SHIPPED | OUTPATIENT
Start: 2023-10-20 | End: 2024-01-26 | Stop reason: SDUPTHER

## 2023-10-20 RX ORDER — LOSARTAN POTASSIUM 50 MG/1
50 TABLET ORAL DAILY
Qty: 90 TABLET | Refills: 0 | Status: SHIPPED | OUTPATIENT
Start: 2023-10-20 | End: 2024-01-26 | Stop reason: SDUPTHER

## 2023-10-20 RX ORDER — ROSUVASTATIN CALCIUM 20 MG/1
20 TABLET, COATED ORAL DAILY
Qty: 90 TABLET | Refills: 0 | Status: SHIPPED | OUTPATIENT
Start: 2023-10-20 | End: 2024-01-26 | Stop reason: SDUPTHER

## 2023-10-20 RX ORDER — METOPROLOL TARTRATE 100 MG/1
100 TABLET ORAL 2 TIMES DAILY
Qty: 180 TABLET | Refills: 0 | Status: SHIPPED | OUTPATIENT
Start: 2023-10-20 | End: 2024-01-26 | Stop reason: SDUPTHER

## 2023-10-20 ASSESSMENT — ENCOUNTER SYMPTOMS
DYSURIA: 0
BLOOD IN STOOL: 0
EYE PAIN: 0
CHILLS: 0
FEVER: 0
BRUISES/BLEEDS EASILY: 0
WEAKNESS: 0
ABDOMINAL DISTENTION: 0
BACK PAIN: 1
ARTHRALGIAS: 1
ADENOPATHY: 0
CONSTIPATION: 0
HEADACHES: 0
NERVOUS/ANXIOUS: 0
DIZZINESS: 0
ABDOMINAL PAIN: 0
EYE REDNESS: 0
FATIGUE: 0
SORE THROAT: 0
COUGH: 0
DIFFICULTY URINATING: 0
CHEST TIGHTNESS: 0
APPETITE CHANGE: 0
DIARRHEA: 0
DYSPHORIC MOOD: 0
SHORTNESS OF BREATH: 0

## 2023-10-20 NOTE — PROGRESS NOTES
"Subjective   Patient ID: Ryan Monreal is a 63 y.o. male who presents for Follow-up.  Doing a little better with sciatica pain, hip bursitis and knee pain. Back working on physical. He is doing better with low sugar/carb diet. A1c 7.9, improved. Not checking BS.  Doing PT for sciatica which is helping. Needs flu shot today        Review of Systems   Constitutional:  Negative for appetite change, chills, fatigue and fever.   HENT:  Negative for congestion, hearing loss and sore throat.    Eyes:  Negative for pain, redness and visual disturbance.   Respiratory:  Negative for cough, chest tightness and shortness of breath.    Cardiovascular:  Negative for chest pain and leg swelling.   Gastrointestinal:  Negative for abdominal distention, abdominal pain, blood in stool, constipation and diarrhea.   Genitourinary:  Negative for difficulty urinating and dysuria.   Musculoskeletal:  Positive for arthralgias and back pain.   Skin:  Negative for rash.   Neurological:  Negative for dizziness, weakness and headaches.   Hematological:  Negative for adenopathy. Does not bruise/bleed easily.   Psychiatric/Behavioral:  Negative for dysphoric mood. The patient is not nervous/anxious.        Objective   /86   Pulse 60   Resp 12   Ht 1.854 m (6' 1\")   Wt 104 kg (230 lb)   SpO2 98%   BMI 30.34 kg/m²    Physical Exam  Constitutional:       General: He is not in acute distress.     Appearance: Normal appearance.   Cardiovascular:      Rate and Rhythm: Normal rate and regular rhythm.      Heart sounds: Normal heart sounds. No murmur heard.  Pulmonary:      Effort: Pulmonary effort is normal.      Breath sounds: Normal breath sounds.   Abdominal:      Palpations: Abdomen is soft.      Tenderness: There is no abdominal tenderness.   Neurological:      Mental Status: He is alert.   Psychiatric:         Mood and Affect: Mood normal.         Judgment: Judgment normal.           Assessment/Plan   Diagnoses and all orders for " this visit:  DM type 2 with diabetic dyslipidemia - A1c improving but not at goal. Discussed option of adding to medication but he prefers to keep working on getting back exercising.  Follow up in 3months, 30min for physical

## 2023-10-20 NOTE — PROGRESS NOTES
"Subjective   Patient ID: Ryan Monreal is a 63 y.o. male who presents for Follow-up.    HPI     Review of Systems    Objective   /86   Pulse 60   Resp 12   Ht 1.854 m (6' 1\")   Wt 104 kg (230 lb)   SpO2 98%   BMI 30.34 kg/m²     Physical Exam    Assessment/Plan          "

## 2023-10-27 ENCOUNTER — APPOINTMENT (OUTPATIENT)
Dept: PHYSICAL THERAPY | Facility: CLINIC | Age: 63
End: 2023-10-27
Payer: COMMERCIAL

## 2023-11-06 DIAGNOSIS — E78.5 DM TYPE 2 WITH DIABETIC DYSLIPIDEMIA (MULTI): ICD-10-CM

## 2023-11-06 DIAGNOSIS — E11.69 DM TYPE 2 WITH DIABETIC DYSLIPIDEMIA (MULTI): ICD-10-CM

## 2023-11-14 RX ORDER — METFORMIN HYDROCHLORIDE 500 MG/1
TABLET, EXTENDED RELEASE ORAL
Qty: 360 TABLET | Refills: 0 | Status: SHIPPED | OUTPATIENT
Start: 2023-11-14 | End: 2024-01-26 | Stop reason: SDUPTHER

## 2024-01-24 ENCOUNTER — LAB (OUTPATIENT)
Dept: LAB | Facility: LAB | Age: 64
End: 2024-01-24
Payer: COMMERCIAL

## 2024-01-24 DIAGNOSIS — E11.69 DM TYPE 2 WITH DIABETIC DYSLIPIDEMIA (MULTI): ICD-10-CM

## 2024-01-24 DIAGNOSIS — E78.5 DM TYPE 2 WITH DIABETIC DYSLIPIDEMIA (MULTI): ICD-10-CM

## 2024-01-24 LAB
EST. AVERAGE GLUCOSE BLD GHB EST-MCNC: 200 MG/DL
HBA1C MFR BLD: 8.6 %

## 2024-01-24 PROCEDURE — 36415 COLL VENOUS BLD VENIPUNCTURE: CPT

## 2024-01-24 PROCEDURE — 83036 HEMOGLOBIN GLYCOSYLATED A1C: CPT

## 2024-01-26 ENCOUNTER — OFFICE VISIT (OUTPATIENT)
Dept: PRIMARY CARE | Facility: CLINIC | Age: 64
End: 2024-01-26
Payer: COMMERCIAL

## 2024-01-26 VITALS
HEIGHT: 73 IN | SYSTOLIC BLOOD PRESSURE: 126 MMHG | RESPIRATION RATE: 12 BRPM | OXYGEN SATURATION: 98 % | BODY MASS INDEX: 30.75 KG/M2 | HEART RATE: 51 BPM | WEIGHT: 232 LBS | DIASTOLIC BLOOD PRESSURE: 74 MMHG

## 2024-01-26 DIAGNOSIS — E78.2 MIXED DYSLIPIDEMIA: ICD-10-CM

## 2024-01-26 DIAGNOSIS — I10 HTN (HYPERTENSION), BENIGN: ICD-10-CM

## 2024-01-26 DIAGNOSIS — E11.69 DM TYPE 2 WITH DIABETIC DYSLIPIDEMIA (MULTI): ICD-10-CM

## 2024-01-26 DIAGNOSIS — Z00.00 HEALTHCARE MAINTENANCE: ICD-10-CM

## 2024-01-26 DIAGNOSIS — E78.5 DM TYPE 2 WITH DIABETIC DYSLIPIDEMIA (MULTI): ICD-10-CM

## 2024-01-26 DIAGNOSIS — K21.9 GASTROESOPHAGEAL REFLUX DISEASE WITHOUT ESOPHAGITIS: ICD-10-CM

## 2024-01-26 DIAGNOSIS — E66.09 CLASS 1 OBESITY DUE TO EXCESS CALORIES WITH SERIOUS COMORBIDITY AND BODY MASS INDEX (BMI) OF 30.0 TO 30.9 IN ADULT: Primary | ICD-10-CM

## 2024-01-26 PROCEDURE — 3074F SYST BP LT 130 MM HG: CPT | Performed by: FAMILY MEDICINE

## 2024-01-26 PROCEDURE — 3008F BODY MASS INDEX DOCD: CPT | Performed by: FAMILY MEDICINE

## 2024-01-26 PROCEDURE — 1036F TOBACCO NON-USER: CPT | Performed by: FAMILY MEDICINE

## 2024-01-26 PROCEDURE — 99214 OFFICE O/P EST MOD 30 MIN: CPT | Performed by: FAMILY MEDICINE

## 2024-01-26 PROCEDURE — 3052F HG A1C>EQUAL 8.0%<EQUAL 9.0%: CPT | Performed by: FAMILY MEDICINE

## 2024-01-26 PROCEDURE — 3078F DIAST BP <80 MM HG: CPT | Performed by: FAMILY MEDICINE

## 2024-01-26 PROCEDURE — 4010F ACE/ARB THERAPY RXD/TAKEN: CPT | Performed by: FAMILY MEDICINE

## 2024-01-26 RX ORDER — METFORMIN HYDROCHLORIDE 500 MG/1
TABLET, EXTENDED RELEASE ORAL
Qty: 360 TABLET | Refills: 0 | Status: SHIPPED | OUTPATIENT
Start: 2024-01-26 | End: 2024-04-29 | Stop reason: DRUGHIGH

## 2024-01-26 RX ORDER — LOSARTAN POTASSIUM 50 MG/1
50 TABLET ORAL DAILY
Qty: 90 TABLET | Refills: 0 | Status: SHIPPED | OUTPATIENT
Start: 2024-01-26 | End: 2024-04-29 | Stop reason: SDUPTHER

## 2024-01-26 RX ORDER — ROSUVASTATIN CALCIUM 20 MG/1
20 TABLET, COATED ORAL DAILY
Qty: 90 TABLET | Refills: 0 | Status: SHIPPED | OUTPATIENT
Start: 2024-01-26 | End: 2024-04-29 | Stop reason: SDUPTHER

## 2024-01-26 RX ORDER — METOPROLOL TARTRATE 100 MG/1
100 TABLET ORAL 2 TIMES DAILY
Qty: 180 TABLET | Refills: 0 | Status: SHIPPED | OUTPATIENT
Start: 2024-01-26

## 2024-01-26 RX ORDER — EMPAGLIFLOZIN 25 MG/1
25 TABLET, FILM COATED ORAL DAILY
Qty: 90 TABLET | Refills: 0 | Status: SHIPPED | OUTPATIENT
Start: 2024-01-26 | End: 2024-04-29 | Stop reason: SDUPTHER

## 2024-01-26 RX ORDER — OMEPRAZOLE 40 MG/1
40 CAPSULE, DELAYED RELEASE ORAL
Qty: 90 CAPSULE | Refills: 0 | Status: SHIPPED | OUTPATIENT
Start: 2024-01-26

## 2024-01-26 ASSESSMENT — ENCOUNTER SYMPTOMS
CHEST TIGHTNESS: 0
FATIGUE: 0
COUGH: 0
WEAKNESS: 0
APPETITE CHANGE: 0
CONSTIPATION: 0
EYE PAIN: 0
BRUISES/BLEEDS EASILY: 0
EYE REDNESS: 0
ABDOMINAL PAIN: 0
CHILLS: 0
DYSURIA: 0
SORE THROAT: 0
ADENOPATHY: 0
HEADACHES: 0
DIZZINESS: 0
NERVOUS/ANXIOUS: 0
BACK PAIN: 0
ARTHRALGIAS: 0
DIARRHEA: 0
ABDOMINAL DISTENTION: 0
DYSPHORIC MOOD: 0
DIFFICULTY URINATING: 0
SHORTNESS OF BREATH: 0
FEVER: 0
BLOOD IN STOOL: 0

## 2024-01-26 NOTE — PROGRESS NOTES
"Subjective   Patient ID: Ryan Monreal is a 63 y.o. male who presents for Annual Exam.  Pt is here for f/u Type 2 diabetes.  Job has changed and working at desk, compared to physically active prior getting 13K steps at work per day.  Pt is just getting back to following low carb diet, and is exercising 0 days per week, planning to restart 5 days per week.  Taking Metformin, Jardiance.   1 x daily accucheks . PM readings are in 100-130s range.   A1c 8.6  Eye exam is current  Pt does not have foot pain, paresthesias, or numbness in feet. Foot checks daily - yes .  Following with podiatry -  no    Denies vision changes, abdominal pain, excessive thirst, frequent urination.         Review of Systems   Constitutional:  Negative for appetite change, chills, fatigue and fever.   HENT:  Negative for congestion, hearing loss and sore throat.    Eyes:  Negative for pain, redness and visual disturbance.   Respiratory:  Negative for cough, chest tightness and shortness of breath.    Cardiovascular:  Negative for chest pain and leg swelling.   Gastrointestinal:  Negative for abdominal distention, abdominal pain, blood in stool, constipation and diarrhea.   Genitourinary:  Negative for difficulty urinating and dysuria.   Musculoskeletal:  Negative for arthralgias and back pain.   Skin:  Negative for rash.   Neurological:  Negative for dizziness, weakness and headaches.   Hematological:  Negative for adenopathy. Does not bruise/bleed easily.   Psychiatric/Behavioral:  Negative for dysphoric mood. The patient is not nervous/anxious.        Objective   /74   Pulse 51   Resp 12   Ht 1.854 m (6' 1\")   Wt 105 kg (232 lb)   SpO2 98%   BMI 30.61 kg/m²    Physical Exam  Constitutional:       General: He is not in acute distress.     Appearance: Normal appearance.   Cardiovascular:      Rate and Rhythm: Normal rate and regular rhythm.      Heart sounds: Normal heart sounds. No murmur heard.  Pulmonary:      Effort: Pulmonary " effort is normal.      Breath sounds: Normal breath sounds.   Abdominal:      Palpations: Abdomen is soft.      Tenderness: There is no abdominal tenderness.   Neurological:      Mental Status: He is alert.   Psychiatric:         Mood and Affect: Mood normal.         Judgment: Judgment normal.       Assessment/Plan   Diagnoses and all orders for this visit:  DM type 2 with diabetic dyslipidemia  - A1c higher, keep working on low carb diet and regular exercise. Will start Ozempic injection 1x weekly.  High BP/ Cholesterol - doing well on current meds, continue and monitor.  Follow up in 3months, 30min for physical

## 2024-01-26 NOTE — PROGRESS NOTES
"Subjective   Patient ID: Ryan Monreal is a 63 y.o. male who presents for Annual Exam.    HPI     Review of Systems    Objective   /74   Pulse 51   Resp 12   Ht 1.854 m (6' 1\")   Wt 105 kg (232 lb)   SpO2 98%   BMI 30.61 kg/m²     Physical Exam    Assessment/Plan          "

## 2024-03-15 DIAGNOSIS — E11.69 DM TYPE 2 WITH DIABETIC DYSLIPIDEMIA (MULTI): ICD-10-CM

## 2024-03-15 DIAGNOSIS — E66.09 CLASS 1 OBESITY DUE TO EXCESS CALORIES WITH SERIOUS COMORBIDITY AND BODY MASS INDEX (BMI) OF 30.0 TO 30.9 IN ADULT: ICD-10-CM

## 2024-03-15 DIAGNOSIS — E78.5 DM TYPE 2 WITH DIABETIC DYSLIPIDEMIA (MULTI): ICD-10-CM

## 2024-03-16 DIAGNOSIS — E11.69 DM TYPE 2 WITH DIABETIC DYSLIPIDEMIA (MULTI): ICD-10-CM

## 2024-03-16 DIAGNOSIS — E66.09 CLASS 1 OBESITY DUE TO EXCESS CALORIES WITH SERIOUS COMORBIDITY AND BODY MASS INDEX (BMI) OF 30.0 TO 30.9 IN ADULT: ICD-10-CM

## 2024-03-16 DIAGNOSIS — E78.5 DM TYPE 2 WITH DIABETIC DYSLIPIDEMIA (MULTI): ICD-10-CM

## 2024-04-01 RX ORDER — SEMAGLUTIDE 0.68 MG/ML
0.25 INJECTION, SOLUTION SUBCUTANEOUS
Qty: 3 ML | Refills: 0 | OUTPATIENT
Start: 2024-04-01

## 2024-04-05 ENCOUNTER — APPOINTMENT (OUTPATIENT)
Dept: SLEEP MEDICINE | Facility: CLINIC | Age: 64
End: 2024-04-05
Payer: COMMERCIAL

## 2024-04-25 ENCOUNTER — LAB (OUTPATIENT)
Dept: LAB | Facility: LAB | Age: 64
End: 2024-04-25
Payer: COMMERCIAL

## 2024-04-25 DIAGNOSIS — E78.5 DM TYPE 2 WITH DIABETIC DYSLIPIDEMIA (MULTI): ICD-10-CM

## 2024-04-25 DIAGNOSIS — E11.69 DM TYPE 2 WITH DIABETIC DYSLIPIDEMIA (MULTI): ICD-10-CM

## 2024-04-25 DIAGNOSIS — I10 HTN (HYPERTENSION), BENIGN: ICD-10-CM

## 2024-04-25 DIAGNOSIS — Z00.00 HEALTHCARE MAINTENANCE: ICD-10-CM

## 2024-04-25 DIAGNOSIS — E78.2 MIXED DYSLIPIDEMIA: ICD-10-CM

## 2024-04-25 LAB
ALBUMIN SERPL BCP-MCNC: 4.3 G/DL (ref 3.4–5)
ALP SERPL-CCNC: 49 U/L (ref 33–136)
ALT SERPL W P-5'-P-CCNC: 26 U/L (ref 10–52)
ANION GAP SERPL CALC-SCNC: 14 MMOL/L (ref 10–20)
APPEARANCE UR: CLEAR
AST SERPL W P-5'-P-CCNC: 17 U/L (ref 9–39)
BILIRUB SERPL-MCNC: 0.6 MG/DL (ref 0–1.2)
BILIRUB UR STRIP.AUTO-MCNC: NEGATIVE MG/DL
BUN SERPL-MCNC: 18 MG/DL (ref 6–23)
CALCIUM SERPL-MCNC: 9.7 MG/DL (ref 8.6–10.6)
CHLORIDE SERPL-SCNC: 104 MMOL/L (ref 98–107)
CHOLEST SERPL-MCNC: 114 MG/DL (ref 0–199)
CHOLESTEROL/HDL RATIO: 3
CO2 SERPL-SCNC: 26 MMOL/L (ref 21–32)
COLOR UR: ABNORMAL
CREAT SERPL-MCNC: 0.84 MG/DL (ref 0.5–1.3)
CREAT UR-MCNC: 68.5 MG/DL (ref 20–370)
EGFRCR SERPLBLD CKD-EPI 2021: >90 ML/MIN/1.73M*2
ERYTHROCYTE [DISTWIDTH] IN BLOOD BY AUTOMATED COUNT: 13.1 % (ref 11.5–14.5)
EST. AVERAGE GLUCOSE BLD GHB EST-MCNC: 171 MG/DL
GLUCOSE SERPL-MCNC: 126 MG/DL (ref 74–99)
GLUCOSE UR STRIP.AUTO-MCNC: ABNORMAL MG/DL
HBA1C MFR BLD: 7.6 %
HCT VFR BLD AUTO: 49.9 % (ref 41–52)
HCV AB SER QL: NONREACTIVE
HDLC SERPL-MCNC: 37.5 MG/DL
HGB BLD-MCNC: 16.6 G/DL (ref 13.5–17.5)
KETONES UR STRIP.AUTO-MCNC: NEGATIVE MG/DL
LDLC SERPL CALC-MCNC: 59 MG/DL
LEUKOCYTE ESTERASE UR QL STRIP.AUTO: NEGATIVE
MCH RBC QN AUTO: 29.3 PG (ref 26–34)
MCHC RBC AUTO-ENTMCNC: 33.3 G/DL (ref 32–36)
MCV RBC AUTO: 88 FL (ref 80–100)
MICROALBUMIN UR-MCNC: 11.5 MG/L
MICROALBUMIN/CREAT UR: 16.8 UG/MG CREAT
NITRITE UR QL STRIP.AUTO: NEGATIVE
NON HDL CHOLESTEROL: 77 MG/DL (ref 0–149)
NRBC BLD-RTO: 0 /100 WBCS (ref 0–0)
PH UR STRIP.AUTO: 5 [PH]
PLATELET # BLD AUTO: 148 X10*3/UL (ref 150–450)
POTASSIUM SERPL-SCNC: 4.5 MMOL/L (ref 3.5–5.3)
PROT SERPL-MCNC: 6.9 G/DL (ref 6.4–8.2)
PROT UR STRIP.AUTO-MCNC: NEGATIVE MG/DL
PSA SERPL-MCNC: 0.69 NG/ML
RBC # BLD AUTO: 5.67 X10*6/UL (ref 4.5–5.9)
RBC # UR STRIP.AUTO: NEGATIVE /UL
SODIUM SERPL-SCNC: 139 MMOL/L (ref 136–145)
SP GR UR STRIP.AUTO: 1.03
TRIGL SERPL-MCNC: 90 MG/DL (ref 0–149)
UROBILINOGEN UR STRIP.AUTO-MCNC: NORMAL MG/DL
VLDL: 18 MG/DL (ref 0–40)
WBC # BLD AUTO: 7.6 X10*3/UL (ref 4.4–11.3)

## 2024-04-25 PROCEDURE — 36415 COLL VENOUS BLD VENIPUNCTURE: CPT

## 2024-04-25 PROCEDURE — 82043 UR ALBUMIN QUANTITATIVE: CPT

## 2024-04-25 PROCEDURE — 82570 ASSAY OF URINE CREATININE: CPT

## 2024-04-25 PROCEDURE — 86803 HEPATITIS C AB TEST: CPT

## 2024-04-25 PROCEDURE — 81003 URINALYSIS AUTO W/O SCOPE: CPT

## 2024-04-25 PROCEDURE — 80053 COMPREHEN METABOLIC PANEL: CPT

## 2024-04-25 PROCEDURE — 85027 COMPLETE CBC AUTOMATED: CPT

## 2024-04-25 PROCEDURE — 80061 LIPID PANEL: CPT

## 2024-04-25 PROCEDURE — 83036 HEMOGLOBIN GLYCOSYLATED A1C: CPT

## 2024-04-25 PROCEDURE — 84153 ASSAY OF PSA TOTAL: CPT

## 2024-04-26 ENCOUNTER — TELEPHONE (OUTPATIENT)
Dept: PRIMARY CARE | Facility: CLINIC | Age: 64
End: 2024-04-26
Payer: COMMERCIAL

## 2024-04-26 LAB — HOLD SPECIMEN: NORMAL

## 2024-04-26 NOTE — TELEPHONE ENCOUNTER
----- Message from Tony Miranda MD sent at 4/25/2024  8:25 PM EDT -----  A1c improved but still in uncontrolled diabetic range. HDL is low, recommend increasing cardiovascular exercise. Other labs look good.   ----- Message -----  From: Lab, Background User  Sent: 4/25/2024   4:19 PM EDT  To: Tony Miranda MD

## 2024-04-29 ENCOUNTER — OFFICE VISIT (OUTPATIENT)
Dept: PRIMARY CARE | Facility: CLINIC | Age: 64
End: 2024-04-29
Payer: COMMERCIAL

## 2024-04-29 VITALS
WEIGHT: 234 LBS | HEART RATE: 60 BPM | SYSTOLIC BLOOD PRESSURE: 122 MMHG | DIASTOLIC BLOOD PRESSURE: 70 MMHG | BODY MASS INDEX: 31.01 KG/M2 | OXYGEN SATURATION: 97 % | RESPIRATION RATE: 12 BRPM | HEIGHT: 73 IN

## 2024-04-29 DIAGNOSIS — E11.69 DM TYPE 2 WITH DIABETIC DYSLIPIDEMIA (MULTI): ICD-10-CM

## 2024-04-29 DIAGNOSIS — I10 HTN (HYPERTENSION), BENIGN: ICD-10-CM

## 2024-04-29 DIAGNOSIS — E78.2 MIXED DYSLIPIDEMIA: ICD-10-CM

## 2024-04-29 DIAGNOSIS — E78.5 DM TYPE 2 WITH DIABETIC DYSLIPIDEMIA (MULTI): ICD-10-CM

## 2024-04-29 DIAGNOSIS — Z00.00 HEALTHCARE MAINTENANCE: Primary | ICD-10-CM

## 2024-04-29 PROCEDURE — 99396 PREV VISIT EST AGE 40-64: CPT | Performed by: FAMILY MEDICINE

## 2024-04-29 PROCEDURE — 3061F NEG MICROALBUMINURIA REV: CPT | Performed by: FAMILY MEDICINE

## 2024-04-29 PROCEDURE — 3048F LDL-C <100 MG/DL: CPT | Performed by: FAMILY MEDICINE

## 2024-04-29 PROCEDURE — 3008F BODY MASS INDEX DOCD: CPT | Performed by: FAMILY MEDICINE

## 2024-04-29 PROCEDURE — 3078F DIAST BP <80 MM HG: CPT | Performed by: FAMILY MEDICINE

## 2024-04-29 PROCEDURE — 3051F HG A1C>EQUAL 7.0%<8.0%: CPT | Performed by: FAMILY MEDICINE

## 2024-04-29 PROCEDURE — 4010F ACE/ARB THERAPY RXD/TAKEN: CPT | Performed by: FAMILY MEDICINE

## 2024-04-29 PROCEDURE — 3074F SYST BP LT 130 MM HG: CPT | Performed by: FAMILY MEDICINE

## 2024-04-29 PROCEDURE — 1036F TOBACCO NON-USER: CPT | Performed by: FAMILY MEDICINE

## 2024-04-29 RX ORDER — METFORMIN HYDROCHLORIDE 500 MG/1
TABLET, EXTENDED RELEASE ORAL
Qty: 360 TABLET | Refills: 0 | Status: SHIPPED | OUTPATIENT
Start: 2024-04-29 | End: 2024-05-02 | Stop reason: SDUPTHER

## 2024-04-29 RX ORDER — LOSARTAN POTASSIUM 50 MG/1
50 TABLET ORAL DAILY
Qty: 90 TABLET | Refills: 0 | Status: SHIPPED | OUTPATIENT
Start: 2024-04-29

## 2024-04-29 RX ORDER — EMPAGLIFLOZIN 25 MG/1
25 TABLET, FILM COATED ORAL DAILY
Qty: 90 TABLET | Refills: 0 | Status: SHIPPED | OUTPATIENT
Start: 2024-04-29

## 2024-04-29 RX ORDER — ROSUVASTATIN CALCIUM 20 MG/1
20 TABLET, COATED ORAL DAILY
Qty: 90 TABLET | Refills: 0 | Status: SHIPPED | OUTPATIENT
Start: 2024-04-29

## 2024-04-29 ASSESSMENT — ENCOUNTER SYMPTOMS
SORE THROAT: 0
CHEST TIGHTNESS: 0
FATIGUE: 0
ARTHRALGIAS: 0
DIZZINESS: 0
BACK PAIN: 0
BLOOD IN STOOL: 0
DIARRHEA: 0
HEADACHES: 0
WEAKNESS: 0
BRUISES/BLEEDS EASILY: 0
CONSTIPATION: 0
NERVOUS/ANXIOUS: 0
ABDOMINAL PAIN: 0
ABDOMINAL DISTENTION: 0
CHILLS: 0
FEVER: 0
EYE PAIN: 0
DYSURIA: 0
ADENOPATHY: 0
SHORTNESS OF BREATH: 0
COUGH: 0
EYE REDNESS: 0
APPETITE CHANGE: 0
DIFFICULTY URINATING: 0
DYSPHORIC MOOD: 0

## 2024-04-29 NOTE — PROGRESS NOTES
"Subjective   Patient ID: Ryan Monreal is a 64 y.o. male who presents for Annual Exam.  PMHX, PSHx, Fam hx, and Social hx reviewed.   New concerns none  Vaccines current  Dentist seen at least yearly yes  Vision concerns none  Hearing concerns none  Diet is improving.    Smoker - none  Alcohol use - 2 drinks per week   Exercising 2-3 days per week.   Colonoscopy 2016, current     A1c better, still elevated 7.6. he is working on diet and exercise. HDL low, other labs.           Review of Systems   Constitutional:  Negative for appetite change, chills, fatigue and fever.   HENT:  Negative for congestion, hearing loss and sore throat.    Eyes:  Negative for pain, redness and visual disturbance.   Respiratory:  Negative for cough, chest tightness and shortness of breath.    Cardiovascular:  Negative for chest pain and leg swelling.   Gastrointestinal:  Negative for abdominal distention, abdominal pain, blood in stool, constipation and diarrhea.   Genitourinary:  Negative for difficulty urinating and dysuria.   Musculoskeletal:  Negative for arthralgias and back pain.   Skin:  Negative for rash.   Neurological:  Negative for dizziness, weakness and headaches.   Hematological:  Negative for adenopathy. Does not bruise/bleed easily.   Psychiatric/Behavioral:  Negative for dysphoric mood. The patient is not nervous/anxious.        Objective   /70   Pulse 60   Resp 12   Ht 1.854 m (6' 1\")   Wt 106 kg (234 lb)   SpO2 97%   BMI 30.87 kg/m²    Physical Exam  Constitutional:       General: He is not in acute distress.     Appearance: Normal appearance. He is not ill-appearing.   HENT:      Head: Normocephalic and atraumatic.      Right Ear: Tympanic membrane, ear canal and external ear normal.      Left Ear: Tympanic membrane, ear canal and external ear normal.      Nose: Nose normal.      Mouth/Throat:      Mouth: Mucous membranes are moist.      Pharynx: No oropharyngeal exudate or posterior oropharyngeal " erythema.   Eyes:      Extraocular Movements: Extraocular movements intact.      Conjunctiva/sclera: Conjunctivae normal.      Pupils: Pupils are equal, round, and reactive to light.   Neck:      Vascular: No carotid bruit.   Cardiovascular:      Rate and Rhythm: Normal rate and regular rhythm.      Heart sounds: Normal heart sounds. No murmur heard.  Pulmonary:      Breath sounds: Normal breath sounds. No wheezing, rhonchi or rales.   Abdominal:      General: Bowel sounds are normal. There is no distension.      Palpations: Abdomen is soft. There is no mass.      Tenderness: There is no abdominal tenderness.   Musculoskeletal:         General: No swelling or deformity.      Cervical back: Neck supple. No tenderness.   Lymphadenopathy:      Cervical: No cervical adenopathy.   Skin:     General: Skin is warm and dry.      Findings: No lesion or rash.   Neurological:      Mental Status: He is alert and oriented to person, place, and time.      Sensory: No sensory deficit.      Motor: No weakness.      Coordination: Coordination normal.      Deep Tendon Reflexes: Reflexes normal.   Psychiatric:         Mood and Affect: Mood normal.         Behavior: Behavior normal.         Judgment: Judgment normal.       Assessment/Plan   Diagnoses and all orders for this visit:  Healthcare maintenance - vaccines current. Labs reviewed and discussed. Colonoscopy current.  DM type 2 with diabetic dyslipidemia  - improving, need to keep working on diet and exercise.  Follow up in 3months, 15mins

## 2024-04-29 NOTE — PROGRESS NOTES
"Subjective   Patient ID: Ryan Monreal is a 64 y.o. male who presents for Annual Exam.    HPI     Review of Systems    Objective   /70   Pulse 60   Resp 12   Ht 1.854 m (6' 1\")   Wt 106 kg (234 lb)   SpO2 97%   BMI 30.87 kg/m²     Physical Exam    Assessment/Plan          "

## 2024-05-02 DIAGNOSIS — E66.09 CLASS 1 OBESITY DUE TO EXCESS CALORIES WITH SERIOUS COMORBIDITY AND BODY MASS INDEX (BMI) OF 30.0 TO 30.9 IN ADULT: ICD-10-CM

## 2024-05-02 DIAGNOSIS — E78.5 DM TYPE 2 WITH DIABETIC DYSLIPIDEMIA (MULTI): ICD-10-CM

## 2024-05-02 DIAGNOSIS — E11.69 DM TYPE 2 WITH DIABETIC DYSLIPIDEMIA (MULTI): ICD-10-CM

## 2024-05-02 RX ORDER — METFORMIN HYDROCHLORIDE 500 MG/1
TABLET, EXTENDED RELEASE ORAL
Qty: 360 TABLET | Refills: 0 | Status: SHIPPED | OUTPATIENT
Start: 2024-05-02

## 2024-05-27 DIAGNOSIS — E78.5 DM TYPE 2 WITH DIABETIC DYSLIPIDEMIA (MULTI): ICD-10-CM

## 2024-05-27 DIAGNOSIS — E66.09 CLASS 1 OBESITY DUE TO EXCESS CALORIES WITH SERIOUS COMORBIDITY AND BODY MASS INDEX (BMI) OF 30.0 TO 30.9 IN ADULT: ICD-10-CM

## 2024-05-27 DIAGNOSIS — E11.69 DM TYPE 2 WITH DIABETIC DYSLIPIDEMIA (MULTI): ICD-10-CM

## 2024-05-31 RX ORDER — SEMAGLUTIDE 0.68 MG/ML
0.25 INJECTION, SOLUTION SUBCUTANEOUS
Qty: 3 ML | Refills: 0 | Status: SHIPPED | OUTPATIENT
Start: 2024-06-02

## 2024-07-09 ENCOUNTER — TELEMEDICINE (OUTPATIENT)
Dept: PRIMARY CARE | Facility: EXTERNAL LOCATION | Age: 64
End: 2024-07-09
Payer: COMMERCIAL

## 2024-07-09 DIAGNOSIS — J01.90 ACUTE NON-RECURRENT SINUSITIS, UNSPECIFIED LOCATION: Primary | ICD-10-CM

## 2024-07-09 RX ORDER — AZITHROMYCIN 250 MG/1
TABLET, FILM COATED ORAL DAILY
Qty: 6 TABLET | Refills: 0 | Status: SHIPPED | OUTPATIENT
Start: 2024-07-09 | End: 2024-07-14

## 2024-07-09 ASSESSMENT — ENCOUNTER SYMPTOMS
ACTIVITY CHANGE: 0
SINUS PRESSURE: 1
VOMITING: 0
CHILLS: 0
FATIGUE: 0
MYALGIAS: 0
EYE ITCHING: 0
SORE THROAT: 1
FEVER: 0
APPETITE CHANGE: 0
DIARRHEA: 0
COUGH: 0
TROUBLE SWALLOWING: 0
PSYCHIATRIC NEGATIVE: 1
RHINORRHEA: 0
EYE PAIN: 0
CONSTIPATION: 0
NAUSEA: 0
SINUS PAIN: 1
NECK PAIN: 1
EYE REDNESS: 0
HEADACHES: 1
VOICE CHANGE: 0
SHORTNESS OF BREATH: 0
WHEEZING: 0
ABDOMINAL PAIN: 0

## 2024-07-09 NOTE — PROGRESS NOTES
Subjective   Patient ID: Ryan Monreal is a 64 y.o. male who presents for sinusitis.    An interactive audio and video telecommunication system which permits real time communications between the patient (at the originating site) and provider (at the distant site) was utilized to provide this telehealth service. At the start of the visit, I introduced myself as the nurse practitioner for their visit today, Shanel GOETZ. I then verified the patients name, , and current physical location. Patient confirmed their current location was IN THE [facility]. If they were currently outside of the [facility], the call was terminated and the patient was directed to alternative means of care. The patient was made aware of the limitations of the telehealth visit. They will not be physically examined and all issues may not be appropriate for a telehealth visit. If at any time this provider felt the visit was not appropriate for a video visit or more advanced care was necessary, the call would be terminated and the patient would be advised to seek proper, in person, medical attention. Pt verbalizes understanding and agrees to continue with televisit.       Pt presents with c/o sinus congestion. Facial pain. S/s started 2 weeks ago. Pressure behind eyes. No mucus. Right ear pain.     [employer]    URI   This is a new problem. The current episode started 1 to 4 weeks ago. There has been no fever. Associated symptoms include congestion, ear pain, headaches, neck pain, a plugged ear sensation, sinus pain and a sore throat. Pertinent negatives include no abdominal pain, chest pain, coughing, diarrhea, nausea, rhinorrhea, sneezing, vomiting or wheezing. He has tried acetaminophen (tylenol cold and sinus) for the symptoms.        Review of Systems   Constitutional:  Negative for activity change, appetite change, chills, fatigue and fever.   HENT:  Positive for congestion, ear pain, postnasal drip, sinus pressure,  sinus pain and sore throat. Negative for ear discharge, rhinorrhea, sneezing, trouble swallowing and voice change.    Eyes:  Negative for pain, redness and itching.   Respiratory:  Negative for cough, shortness of breath and wheezing.    Cardiovascular:  Negative for chest pain.   Gastrointestinal:  Negative for abdominal pain, constipation, diarrhea, nausea and vomiting.   Genitourinary:  Negative for decreased urine volume.   Musculoskeletal:  Positive for neck pain. Negative for myalgias.   Neurological:  Positive for headaches.   Psychiatric/Behavioral: Negative.         Objective   There were no vitals taken for this visit.    Physical Exam  Constitutional:       General: He is not in acute distress.  Neurological:      Mental Status: He is alert.   Psychiatric:         Mood and Affect: Mood normal.         Behavior: Behavior normal.         Thought Content: Thought content normal.         Judgment: Judgment normal.       Physical exam limited d/t video only apt.   Assessment/Plan   Diagnoses and all orders for this visit:  Acute non-recurrent sinusitis, unspecified location  -     azithromycin (Zithromax) 250 mg tablet; Take 2 tablets (500 mg) by mouth once daily for 1 day, THEN 1 tablet (250 mg) once daily for 4 days. Take 2 tabs (500 mg) by mouth today, than 1 daily for 4 days..    Upper respiratory infection-    Get plenty of rest and maintain hydration.    May use humidification in sleeping areas.  Use saline nasal spray to thin mucous.   May use throat lozenges, salt water gargles, or chloraseptic spray as needed to relieve sore throat.    OTC Cold Medications:  Tylenol/Ibuprofen- pain control and fever management  Guaifenesin - thins mucus - take with a full glass of water for best effect  Dextromethorphan - cough suppressant  Decongestants - dry up mucus - pseudoephedrine (stronger, but more side effects) and phenylephrine - be cautious if high blood pressure  Flonase- nasal steroid to improve nasal  inflammation.    Use good hand hygiene to prevent the spread of germs.    Follow up if symptoms persist greater than 7 days.  Seek emergency medical care if fever above 104F, difficulty swallowing, or difficulty breathing occurs.    The total time spent on this visit was 15. This time includes, but is not limited to, reviewing the patient's history, labs, test results, allergies, current medications, interviewing, assessing, examining, diagnosing, counseling, education, placing orders, documenting and care coordination.

## 2024-07-23 ENCOUNTER — LAB (OUTPATIENT)
Dept: LAB | Facility: LAB | Age: 64
End: 2024-07-23
Payer: COMMERCIAL

## 2024-07-23 DIAGNOSIS — E11.69 DM TYPE 2 WITH DIABETIC DYSLIPIDEMIA (MULTI): ICD-10-CM

## 2024-07-23 DIAGNOSIS — E78.5 DM TYPE 2 WITH DIABETIC DYSLIPIDEMIA (MULTI): ICD-10-CM

## 2024-07-23 LAB
EST. AVERAGE GLUCOSE BLD GHB EST-MCNC: 151 MG/DL
HBA1C MFR BLD: 6.9 %

## 2024-07-23 PROCEDURE — 36415 COLL VENOUS BLD VENIPUNCTURE: CPT

## 2024-07-23 PROCEDURE — 83036 HEMOGLOBIN GLYCOSYLATED A1C: CPT

## 2024-07-30 ENCOUNTER — APPOINTMENT (OUTPATIENT)
Dept: PRIMARY CARE | Facility: CLINIC | Age: 64
End: 2024-07-30
Payer: COMMERCIAL

## 2024-07-30 VITALS
WEIGHT: 226 LBS | RESPIRATION RATE: 12 BRPM | OXYGEN SATURATION: 96 % | HEART RATE: 52 BPM | SYSTOLIC BLOOD PRESSURE: 124 MMHG | DIASTOLIC BLOOD PRESSURE: 68 MMHG | HEIGHT: 73 IN | BODY MASS INDEX: 29.95 KG/M2

## 2024-07-30 DIAGNOSIS — K21.9 GASTROESOPHAGEAL REFLUX DISEASE WITHOUT ESOPHAGITIS: ICD-10-CM

## 2024-07-30 DIAGNOSIS — E66.09 CLASS 1 OBESITY DUE TO EXCESS CALORIES WITH SERIOUS COMORBIDITY AND BODY MASS INDEX (BMI) OF 30.0 TO 30.9 IN ADULT: ICD-10-CM

## 2024-07-30 DIAGNOSIS — E78.5 DM TYPE 2 WITH DIABETIC DYSLIPIDEMIA (MULTI): Primary | ICD-10-CM

## 2024-07-30 DIAGNOSIS — E11.69 DM TYPE 2 WITH DIABETIC DYSLIPIDEMIA (MULTI): Primary | ICD-10-CM

## 2024-07-30 DIAGNOSIS — E78.2 MIXED DYSLIPIDEMIA: ICD-10-CM

## 2024-07-30 DIAGNOSIS — K64.8 INTERNAL HEMORRHOIDS: ICD-10-CM

## 2024-07-30 DIAGNOSIS — I10 HTN (HYPERTENSION), BENIGN: ICD-10-CM

## 2024-07-30 PROCEDURE — 99213 OFFICE O/P EST LOW 20 MIN: CPT | Performed by: FAMILY MEDICINE

## 2024-07-30 PROCEDURE — 3061F NEG MICROALBUMINURIA REV: CPT | Performed by: FAMILY MEDICINE

## 2024-07-30 PROCEDURE — 3048F LDL-C <100 MG/DL: CPT | Performed by: FAMILY MEDICINE

## 2024-07-30 PROCEDURE — 4010F ACE/ARB THERAPY RXD/TAKEN: CPT | Performed by: FAMILY MEDICINE

## 2024-07-30 PROCEDURE — 3078F DIAST BP <80 MM HG: CPT | Performed by: FAMILY MEDICINE

## 2024-07-30 PROCEDURE — 3008F BODY MASS INDEX DOCD: CPT | Performed by: FAMILY MEDICINE

## 2024-07-30 PROCEDURE — 3044F HG A1C LEVEL LT 7.0%: CPT | Performed by: FAMILY MEDICINE

## 2024-07-30 PROCEDURE — 3074F SYST BP LT 130 MM HG: CPT | Performed by: FAMILY MEDICINE

## 2024-07-30 PROCEDURE — 1036F TOBACCO NON-USER: CPT | Performed by: FAMILY MEDICINE

## 2024-07-30 RX ORDER — OMEPRAZOLE 40 MG/1
40 CAPSULE, DELAYED RELEASE ORAL
Qty: 90 CAPSULE | Refills: 0 | Status: SHIPPED | OUTPATIENT
Start: 2024-07-30

## 2024-07-30 RX ORDER — LOSARTAN POTASSIUM 50 MG/1
50 TABLET ORAL DAILY
Qty: 90 TABLET | Refills: 0 | Status: SHIPPED | OUTPATIENT
Start: 2024-07-30

## 2024-07-30 RX ORDER — HYDROCORTISONE 25 MG/G
CREAM TOPICAL 4 TIMES DAILY PRN
Qty: 30 G | Refills: 2 | Status: SHIPPED | OUTPATIENT
Start: 2024-07-30 | End: 2025-07-30

## 2024-07-30 RX ORDER — ROSUVASTATIN CALCIUM 20 MG/1
20 TABLET, COATED ORAL DAILY
Qty: 90 TABLET | Refills: 0 | Status: SHIPPED | OUTPATIENT
Start: 2024-07-30

## 2024-07-30 RX ORDER — SEMAGLUTIDE 0.68 MG/ML
0.5 INJECTION, SOLUTION SUBCUTANEOUS
Qty: 9 ML | Refills: 0 | Status: SHIPPED | OUTPATIENT
Start: 2024-07-30

## 2024-07-30 RX ORDER — METOPROLOL TARTRATE 100 MG/1
100 TABLET ORAL 2 TIMES DAILY
Qty: 180 TABLET | Refills: 0 | Status: SHIPPED | OUTPATIENT
Start: 2024-07-30

## 2024-07-30 RX ORDER — EMPAGLIFLOZIN 25 MG/1
25 TABLET, FILM COATED ORAL DAILY
Qty: 90 TABLET | Refills: 0 | Status: SHIPPED | OUTPATIENT
Start: 2024-07-30

## 2024-07-30 RX ORDER — METFORMIN HYDROCHLORIDE 500 MG/1
TABLET, EXTENDED RELEASE ORAL
Qty: 360 TABLET | Refills: 0 | Status: SHIPPED | OUTPATIENT
Start: 2024-07-30

## 2024-07-30 ASSESSMENT — ENCOUNTER SYMPTOMS
FEVER: 0
ABDOMINAL PAIN: 0
CONSTIPATION: 0
DIFFICULTY URINATING: 0
ADENOPATHY: 0
DIARRHEA: 0
EYE REDNESS: 0
DIZZINESS: 0
APPETITE CHANGE: 0
DYSURIA: 0
COUGH: 0
ABDOMINAL DISTENTION: 0
ARTHRALGIAS: 0
BRUISES/BLEEDS EASILY: 0
CHEST TIGHTNESS: 0
DYSPHORIC MOOD: 0
NERVOUS/ANXIOUS: 0
HEADACHES: 0
BLOOD IN STOOL: 0
BACK PAIN: 0
FATIGUE: 0
SHORTNESS OF BREATH: 0
SORE THROAT: 0
EYE PAIN: 0
WEAKNESS: 0
CHILLS: 0

## 2024-07-30 NOTE — PROGRESS NOTES
"Subjective   Patient ID: Ryan Monreal is a 64 y.o. male who presents for Follow-up.  Pt is here for f/u Type 2 diabetes. A1c improved from 3 months ago.  Pt is doing better following low carb diet, and is exercising 4-5 days per week.  Taking Metformin, Jardiance, and Ozempic started 3months.  1 x daily accucheks . Fasting readings are in 100-140s range. No low blood sugar readings have been encountered.  A1c 6.9, down from 7.6  Eye exam is current.  Pt does not have foot pain, paresthesias, or numbness in feet. Foot checks daily - yes  .  Following with podiatry -  no   Denies vision changes, abdominal pain, excessive thirst, frequent urination.         Review of Systems   Constitutional:  Negative for appetite change, chills, fatigue and fever.   HENT:  Negative for congestion, hearing loss and sore throat.    Eyes:  Negative for pain, redness and visual disturbance.   Respiratory:  Negative for cough, chest tightness and shortness of breath.    Cardiovascular:  Negative for chest pain and leg swelling.   Gastrointestinal:  Negative for abdominal distention, abdominal pain, blood in stool, constipation and diarrhea.   Genitourinary:  Negative for difficulty urinating and dysuria.   Musculoskeletal:  Negative for arthralgias and back pain.   Skin:  Negative for rash.   Neurological:  Negative for dizziness, weakness and headaches.   Hematological:  Negative for adenopathy. Does not bruise/bleed easily.   Psychiatric/Behavioral:  Negative for dysphoric mood. The patient is not nervous/anxious.        Objective   /68   Pulse 52   Resp 12   Ht 1.854 m (6' 1\")   Wt 103 kg (226 lb)   SpO2 96%   BMI 29.82 kg/m²    Physical Exam  Constitutional:       General: He is not in acute distress.     Appearance: Normal appearance.   Cardiovascular:      Rate and Rhythm: Normal rate and regular rhythm.      Heart sounds: Normal heart sounds. No murmur heard.  Pulmonary:      Effort: Pulmonary effort is normal.      " Breath sounds: Normal breath sounds.   Abdominal:      Palpations: Abdomen is soft.      Tenderness: There is no abdominal tenderness.   Neurological:      Mental Status: He is alert.   Psychiatric:         Mood and Affect: Mood normal.         Judgment: Judgment normal.           Assessment/Plan   Diagnoses and all orders for this visit:  DM type 2 - improving in upper controlled range on A1c. Will increase dose on Ozempic to 0.5mg per week. Continue to monitor and repeat labs before  next visit.  Follow up in 3 months, 30mins

## 2024-07-30 NOTE — PROGRESS NOTES
"Subjective   Patient ID: Ryan Monreal is a 64 y.o. male who presents for Follow-up.    HPI     Review of Systems    Objective   /68   Pulse 52   Resp 12   Ht 1.854 m (6' 1\")   Wt 103 kg (226 lb)   SpO2 96%   BMI 29.82 kg/m²     Physical Exam    Assessment/Plan          "

## 2024-10-30 ENCOUNTER — LAB (OUTPATIENT)
Dept: LAB | Facility: LAB | Age: 64
End: 2024-10-30
Payer: COMMERCIAL

## 2024-10-30 DIAGNOSIS — E11.69 DM TYPE 2 WITH DIABETIC DYSLIPIDEMIA (MULTI): ICD-10-CM

## 2024-10-30 DIAGNOSIS — E78.2 MIXED DYSLIPIDEMIA: ICD-10-CM

## 2024-10-30 DIAGNOSIS — E78.5 DM TYPE 2 WITH DIABETIC DYSLIPIDEMIA (MULTI): ICD-10-CM

## 2024-10-30 LAB
ALBUMIN SERPL BCP-MCNC: 4.3 G/DL (ref 3.4–5)
ALP SERPL-CCNC: 39 U/L (ref 33–136)
ALT SERPL W P-5'-P-CCNC: 15 U/L (ref 10–52)
ANION GAP SERPL CALC-SCNC: 13 MMOL/L (ref 10–20)
AST SERPL W P-5'-P-CCNC: 14 U/L (ref 9–39)
BILIRUB SERPL-MCNC: 0.7 MG/DL (ref 0–1.2)
BUN SERPL-MCNC: 13 MG/DL (ref 6–23)
CALCIUM SERPL-MCNC: 9.3 MG/DL (ref 8.6–10.6)
CHLORIDE SERPL-SCNC: 104 MMOL/L (ref 98–107)
CHOLEST SERPL-MCNC: 106 MG/DL (ref 0–199)
CHOLESTEROL/HDL RATIO: 2.7
CO2 SERPL-SCNC: 28 MMOL/L (ref 21–32)
CREAT SERPL-MCNC: 0.85 MG/DL (ref 0.5–1.3)
CREAT UR-MCNC: 77.1 MG/DL (ref 20–370)
EGFRCR SERPLBLD CKD-EPI 2021: >90 ML/MIN/1.73M*2
EST. AVERAGE GLUCOSE BLD GHB EST-MCNC: 151 MG/DL
GLUCOSE SERPL-MCNC: 105 MG/DL (ref 74–99)
HBA1C MFR BLD: 6.9 %
HDLC SERPL-MCNC: 40 MG/DL
LDLC SERPL CALC-MCNC: 53 MG/DL
MICROALBUMIN UR-MCNC: 10.7 MG/L
MICROALBUMIN/CREAT UR: 13.9 UG/MG CREAT
NON HDL CHOLESTEROL: 66 MG/DL (ref 0–149)
POTASSIUM SERPL-SCNC: 4.7 MMOL/L (ref 3.5–5.3)
PROT SERPL-MCNC: 6.7 G/DL (ref 6.4–8.2)
SODIUM SERPL-SCNC: 140 MMOL/L (ref 136–145)
TRIGL SERPL-MCNC: 66 MG/DL (ref 0–149)
VLDL: 13 MG/DL (ref 0–40)

## 2024-10-30 PROCEDURE — 80061 LIPID PANEL: CPT

## 2024-10-30 PROCEDURE — 82043 UR ALBUMIN QUANTITATIVE: CPT

## 2024-10-30 PROCEDURE — 82570 ASSAY OF URINE CREATININE: CPT

## 2024-10-30 PROCEDURE — 80053 COMPREHEN METABOLIC PANEL: CPT

## 2024-10-30 PROCEDURE — 83036 HEMOGLOBIN GLYCOSYLATED A1C: CPT

## 2024-10-31 ENCOUNTER — APPOINTMENT (OUTPATIENT)
Dept: PRIMARY CARE | Facility: CLINIC | Age: 64
End: 2024-10-31
Payer: COMMERCIAL

## 2024-10-31 VITALS
DIASTOLIC BLOOD PRESSURE: 68 MMHG | WEIGHT: 225 LBS | RESPIRATION RATE: 12 BRPM | SYSTOLIC BLOOD PRESSURE: 118 MMHG | HEART RATE: 54 BPM | BODY MASS INDEX: 29.82 KG/M2 | HEIGHT: 73 IN | OXYGEN SATURATION: 97 %

## 2024-10-31 DIAGNOSIS — I25.10 ATHEROSCLEROSIS OF NATIVE CORONARY ARTERY OF NATIVE HEART WITHOUT ANGINA PECTORIS: Primary | ICD-10-CM

## 2024-10-31 DIAGNOSIS — I10 HTN (HYPERTENSION), BENIGN: ICD-10-CM

## 2024-10-31 DIAGNOSIS — E78.2 MIXED DYSLIPIDEMIA: ICD-10-CM

## 2024-10-31 DIAGNOSIS — G47.33 OSA ON CPAP: ICD-10-CM

## 2024-10-31 DIAGNOSIS — K21.9 GASTROESOPHAGEAL REFLUX DISEASE WITHOUT ESOPHAGITIS: ICD-10-CM

## 2024-10-31 DIAGNOSIS — E78.5 DM TYPE 2 WITH DIABETIC DYSLIPIDEMIA (MULTI): ICD-10-CM

## 2024-10-31 DIAGNOSIS — Z12.5 SCREENING PSA (PROSTATE SPECIFIC ANTIGEN): ICD-10-CM

## 2024-10-31 DIAGNOSIS — E11.69 DM TYPE 2 WITH DIABETIC DYSLIPIDEMIA (MULTI): ICD-10-CM

## 2024-10-31 PROBLEM — J06.9 VIRAL URI WITH COUGH: Status: RESOLVED | Noted: 2023-06-22 | Resolved: 2024-10-31

## 2024-10-31 PROBLEM — R10.9 RT FLANK PAIN: Status: RESOLVED | Noted: 2023-06-22 | Resolved: 2024-10-31

## 2024-10-31 PROBLEM — K92.1 HEMATOCHEZIA: Status: RESOLVED | Noted: 2023-06-22 | Resolved: 2024-10-31

## 2024-10-31 PROBLEM — R10.9 ABDOMINAL PAIN: Status: RESOLVED | Noted: 2023-06-22 | Resolved: 2024-10-31

## 2024-10-31 PROBLEM — Z20.822 SUSPECTED COVID-19 VIRUS INFECTION: Status: RESOLVED | Noted: 2023-06-22 | Resolved: 2024-10-31

## 2024-10-31 PROCEDURE — 3008F BODY MASS INDEX DOCD: CPT | Performed by: FAMILY MEDICINE

## 2024-10-31 PROCEDURE — 3044F HG A1C LEVEL LT 7.0%: CPT | Performed by: FAMILY MEDICINE

## 2024-10-31 PROCEDURE — 3048F LDL-C <100 MG/DL: CPT | Performed by: FAMILY MEDICINE

## 2024-10-31 PROCEDURE — 1036F TOBACCO NON-USER: CPT | Performed by: FAMILY MEDICINE

## 2024-10-31 PROCEDURE — 3061F NEG MICROALBUMINURIA REV: CPT | Performed by: FAMILY MEDICINE

## 2024-10-31 PROCEDURE — 90471 IMMUNIZATION ADMIN: CPT | Performed by: FAMILY MEDICINE

## 2024-10-31 PROCEDURE — 4010F ACE/ARB THERAPY RXD/TAKEN: CPT | Performed by: FAMILY MEDICINE

## 2024-10-31 PROCEDURE — 99214 OFFICE O/P EST MOD 30 MIN: CPT | Performed by: FAMILY MEDICINE

## 2024-10-31 PROCEDURE — 3078F DIAST BP <80 MM HG: CPT | Performed by: FAMILY MEDICINE

## 2024-10-31 PROCEDURE — 90656 IIV3 VACC NO PRSV 0.5 ML IM: CPT | Performed by: FAMILY MEDICINE

## 2024-10-31 PROCEDURE — 3074F SYST BP LT 130 MM HG: CPT | Performed by: FAMILY MEDICINE

## 2024-10-31 RX ORDER — METFORMIN HYDROCHLORIDE 500 MG/1
TABLET, EXTENDED RELEASE ORAL
Qty: 360 TABLET | Refills: 1 | Status: SHIPPED | OUTPATIENT
Start: 2024-10-31

## 2024-10-31 RX ORDER — EMPAGLIFLOZIN 25 MG/1
25 TABLET, FILM COATED ORAL DAILY
Qty: 90 TABLET | Refills: 1 | Status: SHIPPED | OUTPATIENT
Start: 2024-10-31

## 2024-10-31 RX ORDER — METOPROLOL TARTRATE 100 MG/1
100 TABLET ORAL 2 TIMES DAILY
Qty: 180 TABLET | Refills: 1 | Status: SHIPPED | OUTPATIENT
Start: 2024-10-31

## 2024-10-31 RX ORDER — LOSARTAN POTASSIUM 50 MG/1
50 TABLET ORAL DAILY
Qty: 90 TABLET | Refills: 1 | Status: SHIPPED | OUTPATIENT
Start: 2024-10-31

## 2024-10-31 RX ORDER — SEMAGLUTIDE 0.68 MG/ML
0.5 INJECTION, SOLUTION SUBCUTANEOUS
Qty: 9 ML | Refills: 1 | Status: SHIPPED | OUTPATIENT
Start: 2024-11-03

## 2024-10-31 RX ORDER — ROSUVASTATIN CALCIUM 20 MG/1
20 TABLET, COATED ORAL DAILY
Qty: 90 TABLET | Refills: 1 | Status: SHIPPED | OUTPATIENT
Start: 2024-10-31

## 2024-10-31 RX ORDER — OMEPRAZOLE 40 MG/1
40 CAPSULE, DELAYED RELEASE ORAL
Qty: 90 CAPSULE | Refills: 1 | Status: SHIPPED | OUTPATIENT
Start: 2024-10-31

## 2024-10-31 ASSESSMENT — ENCOUNTER SYMPTOMS
DYSPHORIC MOOD: 0
SORE THROAT: 0
ABDOMINAL DISTENTION: 0
BLOOD IN STOOL: 0
CHILLS: 0
BACK PAIN: 0
DIZZINESS: 0
HEADACHES: 0
WEAKNESS: 0
DYSURIA: 0
DIARRHEA: 0
FATIGUE: 0
APPETITE CHANGE: 0
ARTHRALGIAS: 0
COUGH: 0
SHORTNESS OF BREATH: 0
FEVER: 0
ABDOMINAL PAIN: 0
EYE REDNESS: 0
CONSTIPATION: 0
BRUISES/BLEEDS EASILY: 0
NERVOUS/ANXIOUS: 0
DIFFICULTY URINATING: 0
ADENOPATHY: 0
CHEST TIGHTNESS: 0
EYE PAIN: 0

## 2024-12-10 ENCOUNTER — OFFICE VISIT (OUTPATIENT)
Dept: PRIMARY CARE | Facility: CLINIC | Age: 64
End: 2024-12-10
Payer: COMMERCIAL

## 2024-12-10 ENCOUNTER — APPOINTMENT (OUTPATIENT)
Dept: LAB | Facility: LAB | Age: 64
End: 2024-12-10
Payer: COMMERCIAL

## 2024-12-10 ENCOUNTER — LAB (OUTPATIENT)
Dept: LAB | Facility: LAB | Age: 64
End: 2024-12-10
Payer: COMMERCIAL

## 2024-12-10 VITALS
WEIGHT: 226 LBS | RESPIRATION RATE: 12 BRPM | HEART RATE: 50 BPM | OXYGEN SATURATION: 96 % | SYSTOLIC BLOOD PRESSURE: 122 MMHG | DIASTOLIC BLOOD PRESSURE: 78 MMHG | BODY MASS INDEX: 29.95 KG/M2 | HEIGHT: 73 IN

## 2024-12-10 DIAGNOSIS — R19.7 WATERY DIARRHEA: Primary | ICD-10-CM

## 2024-12-10 PROCEDURE — 3008F BODY MASS INDEX DOCD: CPT | Performed by: FAMILY MEDICINE

## 2024-12-10 PROCEDURE — 4010F ACE/ARB THERAPY RXD/TAKEN: CPT | Performed by: FAMILY MEDICINE

## 2024-12-10 PROCEDURE — 1036F TOBACCO NON-USER: CPT | Performed by: FAMILY MEDICINE

## 2024-12-10 PROCEDURE — 3078F DIAST BP <80 MM HG: CPT | Performed by: FAMILY MEDICINE

## 2024-12-10 PROCEDURE — 99213 OFFICE O/P EST LOW 20 MIN: CPT | Performed by: FAMILY MEDICINE

## 2024-12-10 PROCEDURE — 3044F HG A1C LEVEL LT 7.0%: CPT | Performed by: FAMILY MEDICINE

## 2024-12-10 PROCEDURE — 3048F LDL-C <100 MG/DL: CPT | Performed by: FAMILY MEDICINE

## 2024-12-10 PROCEDURE — 3074F SYST BP LT 130 MM HG: CPT | Performed by: FAMILY MEDICINE

## 2024-12-10 PROCEDURE — 3061F NEG MICROALBUMINURIA REV: CPT | Performed by: FAMILY MEDICINE

## 2024-12-10 ASSESSMENT — ENCOUNTER SYMPTOMS
DYSURIA: 0
BLOOD IN STOOL: 0
APPETITE CHANGE: 0
EYE REDNESS: 0
ARTHRALGIAS: 0
CHILLS: 0
BACK PAIN: 0
FATIGUE: 0
CONSTIPATION: 0
DYSPHORIC MOOD: 0
EYE PAIN: 0
ABDOMINAL PAIN: 1
ABDOMINAL DISTENTION: 0
DIARRHEA: 1
NERVOUS/ANXIOUS: 0
DIZZINESS: 0
DIFFICULTY URINATING: 0
BRUISES/BLEEDS EASILY: 0
COUGH: 0
ADENOPATHY: 0
FEVER: 0
SORE THROAT: 0
HEADACHES: 0
CHEST TIGHTNESS: 0
WEAKNESS: 0
SHORTNESS OF BREATH: 0

## 2024-12-10 NOTE — PROGRESS NOTES
"Subjective   Patient ID: Ryan Monreal is a 64 y.o. male who presents for Abdominal Pain.    HPI     Review of Systems    Objective   /78   Pulse 50   Resp 12   Ht 1.854 m (6' 1\")   Wt 103 kg (226 lb)   SpO2 96%   BMI 29.82 kg/m²     Physical Exam    Assessment/Plan          "

## 2024-12-10 NOTE — PROGRESS NOTES
"Subjective   Patient ID: Ryan Monreal is a 64 y.o. male who presents for Abdominal Pain.  Pt has watery, nonbloody diarrhea and abdominal discomfort. Never had fever or vomiting. Diarrhea currently is 6-10x daily, initially was worse. Onset was 2 weeks ago. Denies recent travel.  Pt reports symptoms are unchanged recently.   It worsens with eating and drinking and improves with BM.   It occurs with frequency of daily and is constant. .   Pt has tried Imodium as needed for treatment with some improvement.     Abdominal Pain  Associated symptoms include diarrhea. Pertinent negatives include no arthralgias, constipation, dysuria, fever or headaches.       Review of Systems   Constitutional:  Negative for appetite change, chills, fatigue and fever.   HENT:  Negative for congestion, hearing loss and sore throat.    Eyes:  Negative for pain, redness and visual disturbance.   Respiratory:  Negative for cough, chest tightness and shortness of breath.    Cardiovascular:  Negative for chest pain and leg swelling.   Gastrointestinal:  Positive for abdominal pain and diarrhea. Negative for abdominal distention, blood in stool and constipation.   Genitourinary:  Negative for difficulty urinating and dysuria.   Musculoskeletal:  Negative for arthralgias and back pain.   Skin:  Negative for rash.   Neurological:  Negative for dizziness, weakness and headaches.   Hematological:  Negative for adenopathy. Does not bruise/bleed easily.   Psychiatric/Behavioral:  Negative for dysphoric mood. The patient is not nervous/anxious.        Objective   /78   Pulse 50   Resp 12   Ht 1.854 m (6' 1\")   Wt 103 kg (226 lb)   SpO2 96%   BMI 29.82 kg/m²    Physical Exam  Constitutional:       General: He is not in acute distress.     Appearance: Normal appearance.   Cardiovascular:      Rate and Rhythm: Normal rate and regular rhythm.      Heart sounds: Normal heart sounds. No murmur heard.  Pulmonary:      Effort: Pulmonary effort " is normal.      Breath sounds: Normal breath sounds.   Abdominal:      General: There is no distension.      Palpations: Abdomen is soft. There is no mass.      Tenderness: There is abdominal tenderness (diffuse, mild). There is no right CVA tenderness, left CVA tenderness, guarding or rebound.      Comments: Hyperactive BS   Neurological:      Mental Status: He is alert.   Psychiatric:         Mood and Affect: Mood normal.         Judgment: Judgment normal.           Assessment/Plan   Diagnoses and all orders for this visit:  Watery diarrhea - with severity and duration of symptoms will check stool studies. Discussed  bland diet, modest sodium intake and keeping well hydrated. May take Imodium as needed.    Follow up as planned

## 2024-12-11 ENCOUNTER — LAB (OUTPATIENT)
Dept: LAB | Facility: LAB | Age: 64
End: 2024-12-11
Payer: COMMERCIAL

## 2024-12-11 DIAGNOSIS — R19.7 WATERY DIARRHEA: ICD-10-CM

## 2024-12-11 PROCEDURE — 87329 GIARDIA AG IA: CPT

## 2024-12-11 PROCEDURE — 87506 IADNA-DNA/RNA PROBE TQ 6-11: CPT

## 2024-12-11 PROCEDURE — 87328 CRYPTOSPORIDIUM AG IA: CPT

## 2024-12-12 LAB

## 2024-12-14 LAB
CRYPTOSP AG STL QL IA: NEGATIVE
G LAMBLIA AG STL QL IA: NEGATIVE

## 2024-12-18 LAB — O+P STL MICRO: NEGATIVE

## 2025-01-21 ENCOUNTER — OFFICE VISIT (OUTPATIENT)
Dept: SLEEP MEDICINE | Facility: CLINIC | Age: 65
End: 2025-01-21
Payer: COMMERCIAL

## 2025-01-21 DIAGNOSIS — G47.39 TREATMENT-EMERGENT CENTRAL SLEEP APNEA: ICD-10-CM

## 2025-01-21 DIAGNOSIS — G47.33 OSA (OBSTRUCTIVE SLEEP APNEA): Primary | ICD-10-CM

## 2025-01-21 PROCEDURE — 99214 OFFICE O/P EST MOD 30 MIN: CPT | Performed by: NURSE PRACTITIONER

## 2025-01-21 PROCEDURE — 4010F ACE/ARB THERAPY RXD/TAKEN: CPT | Performed by: NURSE PRACTITIONER

## 2025-01-21 PROCEDURE — 1036F TOBACCO NON-USER: CPT | Performed by: NURSE PRACTITIONER

## 2025-01-21 ASSESSMENT — SLEEP AND FATIGUE QUESTIONNAIRES
SITING INACTIVE IN A PUBLIC PLACE LIKE A CLASS ROOM OR A MOVIE THEATER: WOULD NEVER DOZE
HOW LIKELY ARE YOU TO NOD OFF OR FALL ASLEEP WHILE SITTING AND TALKING TO SOMEONE: WOULD NEVER DOZE
HOW LIKELY ARE YOU TO NOD OFF OR FALL ASLEEP WHILE WATCHING TV: WOULD NEVER DOZE
DIFFICULTY_STAYING_ASLEEP: MODERATE
WORRIED_DISTRESSED_DUE_TO_SLEEP: NOT AT ALL NOTICEABLE
WAKING_TOO_EARLY: MILD
HOW LIKELY ARE YOU TO NOD OFF OR FALL ASLEEP WHILE SITTING AND READING: MODERATE CHANCE OF DOZING
HOW LIKELY ARE YOU TO NOD OFF OR FALL ASLEEP IN A CAR, WHILE STOPPED FOR A FEW MINUTES IN TRAFFIC: WOULD NEVER DOZE
HOW LIKELY ARE YOU TO NOD OFF OR FALL ASLEEP WHILE SITTING QUIETLY AFTER LUNCH WITHOUT ALCOHOL: WOULD NEVER DOZE
SATISFACTION_WITH_CURRENT_SLEEP_PATTERN: SATISFIED
SLEEP_PROBLEM_NOTICEABLE_TO_OTHERS: SOMEWHAT
ESS-CHAD TOTAL SCORE: 4
HOW LIKELY ARE YOU TO NOD OFF OR FALL ASLEEP WHEN YOU ARE A PASSENGER IN A CAR FOR AN HOUR WITHOUT A BREAK: SLIGHT CHANCE OF DOZING
HOW LIKELY ARE YOU TO NOD OFF OR FALL ASLEEP WHILE LYING DOWN TO REST IN THE AFTERNOON WHEN CIRCUMSTANCES PERMIT: SLIGHT CHANCE OF DOZING
SLEEP_PROBLEM_INTERFERES_DAILY_ACTIVITIES: NOT AT ALL NOTICEABLE

## 2025-01-21 NOTE — PROGRESS NOTES
Patient: Ryan Monreal    [id number]  : 1960 -- AGE 64 y.o.    Provider: LUCIO Donald     Location [facility]   Service Date: 2025              [facility]  Followup Visit Note    HISTORY OF PRESENT ILLNESS       HISTORY OF PRESENT ILLNESS   Ryan Monreal is a 64 y.o. male with past medical history of HTN, CAD, DM2, dyslipidemia, s/p stent in LAD, obesity, GERD, VÍCTOR with TESCA who presents to a [facility] for followup.     PAST SLEEP HISTORY  RYAN has had a sleep study in the past completed on 3/12/2012 showing severe sleep apnea with an AHI of 65.7 and SpO2 sharon of 87%. The recommendation was for PAP titration. CPAP titration was completed on 2012. CPAP was titrated from 4 to 14. At higher pressures, central events developed. BiPAP was also attempted to 18/12. Persistence of events was observed at this setting. Recommendation was for ASV titration. ASV titration was completed on 2012 without success. Recommendation was for repeat ASV titration. ASV titration dated 2012 showed a recommended EPAP min of 11, EPAP max 15, PS min 6, PS max 14.     CURRENT SLEEP HISTORY    On today's visit, the patient reports doing well on PAP machine. Notes the mask is moving around on him a lot. Using a back stock of supplies. Changes straps out about 6 months ago. Asking if there are other styles he could try. He has always worn a full face mask because that is what he was told he needed when he started. Notes he has lost ~ 20 lbs since starting Ozempic. Notes he had lost ~ 20 lb prior to that since sleep testing. No changes from cardiology's perspective. We have last echo on file from . Notes he is sleeping less time that he used to between 5-7 hours. Does not feel tired during the day     RLS Followup:  denies    Insomnia follow up:  Bedtime: 11 pm  Sleep Latency: 15 min  Awakenings:  Wake  time: 5:30 AM  Naps:   7-8 PM for 1 hour    ESS: 4   MARZENA: 6  FOSQ: 38    REVIEW OF SYSTEMS     REVIEW OF SYSTEMS  Review of Systems   All other systems reviewed and are negative.    ALLERGIES AND MEDICATIONS     ALLERGIES  No Known Allergies    MEDICATIONS  Current Outpatient Medications   Medication Sig Dispense Refill    aspirin 81 mg EC tablet Take 1 tablet (81 mg) by mouth once daily.      blood sugar diagnostic (OneTouch Verio test strips) strip once daily.      hydrocortisone (Anusol-HC) 2.5 % rectal cream Insert into the rectum 4 times a day as needed for hemorrhoids (rectal discomfort). Apply to affected areas 30 g 2    Jardiance 25 mg Take 1 tablet (25 mg) by mouth once daily. 90 tablet 1    losartan (Cozaar) 50 mg tablet Take 1 tablet (50 mg) by mouth once daily. 90 tablet 1    metFORMIN  mg 24 hr tablet TAKE 4 TABLETS BY MOUTH ONCE DAILY IN  THE  EVENING  WITH  MEALS  AS  DIRECTED.  DO  NOT  CRUSH,  CHEW  OR  SPLIT 360 tablet 1    metoprolol tartrate (Lopressor) 100 mg tablet Take 1 tablet (100 mg) by mouth 2 times a day. 180 tablet 1    omeprazole (PriLOSEC) 40 mg DR capsule Take 1 capsule (40 mg) by mouth once daily in the morning. Take before meals. Do not crush or chew. 90 capsule 1    rosuvastatin (Crestor) 20 mg tablet Take 1 tablet (20 mg) by mouth once daily. 90 tablet 1    semaglutide (Ozempic) 0.25 mg or 0.5 mg (2 mg/3 mL) pen injector Inject 0.5 mg under the skin 1 (one) time per week. 9 mL 1     No current facility-administered medications for this visit.       PPAST MEDICAL HISTORY  Past Medical History:   Diagnosis Date    Abnormal auditory function study 06/08/2017    Abnormal hearing screen    Acute frontal sinusitis, unspecified 10/11/2016    Acute non-recurrent frontal sinusitis    Acute upper respiratory infection, unspecified 03/28/2022    Viral URI with cough    Adjustment insomnia 10/11/2016    Transient insomnia    Body mass index (BMI) 33.0-33.9, adult 05/16/2019    BMI  33.0-33.9,adult    Contact with and (suspected) exposure to covid-19 03/28/2022    Suspected COVID-19 virus infection    Crossing vessel and stricture of ureter without hydronephrosis 05/10/2021    Ureteral stricture, right    Crossing vessel and stricture of ureter without hydronephrosis 01/05/2022    Ureteral stricture, right    Essential (primary) hypertension 07/26/2013    Benign essential hypertension    Hallux valgus (acquired), unspecified foot 02/15/2021    Hallux valgus with bunions    Old myocardial infarction 07/26/2013    Past myocardial infarction    Other conditions influencing health status 10/15/2018    Uncontrolled type 2 diabetes mellitus without complication, without long-term current use of insulin    Other sleep apnea 03/17/2021    Treatment-emergent central sleep apnea    Pain in left ankle and joints of left foot 05/22/2019    Left ankle pain    Pain in right ankle and joints of right foot 06/05/2020    Right ankle pain    Pain in right knee 01/12/2016    Right knee pain    Personal history of diseases of the blood and blood-forming organs and certain disorders involving the immune mechanism 01/24/2017    History of thrombocytopenia    Personal history of other diseases of the circulatory system 04/28/2015    History of sinus bradycardia    Personal history of other diseases of the digestive system 06/08/2017    History of gastritis    Personal history of other diseases of the digestive system 10/22/2020    History of bloody stools    Personal history of other diseases of the nervous system and sense organs 06/08/2017    History of hearing loss    Personal history of other diseases of the respiratory system 01/16/2018    History of acute sinusitis    Personal history of other endocrine, nutritional and metabolic disease 09/08/2014    History of diabetes mellitus    Personal history of other endocrine, nutritional and metabolic disease 05/16/2019    History of type 2 diabetes mellitus     Personal history of other endocrine, nutritional and metabolic disease 10/25/2016    History of diabetes mellitus    Personal history of other infectious and parasitic diseases 02/22/2016    History of Helicobacter infection    Personal history of other specified conditions 04/23/2018    History of postnasal drip    Personal history of other specified conditions 11/05/2020    History of abdominal pain    Personal history of other specified conditions 01/06/2022    History of right flank pain    Personal history of urinary calculi 06/04/2015    History of renal calculi    Primary central sleep apnea 09/03/2015    Sleep apnea, primary central    Sprain of unspecified ligament of right ankle, initial encounter 06/05/2020    Right ankle sprain    Strain of unspecified muscle, fascia and tendon at wrist and hand level, right hand, initial encounter 04/23/2021    Strain of right wrist, initial encounter    Strain of unspecified muscle, fascia and tendon at wrist and hand level, right hand, initial encounter 04/23/2021    Strain of right hand, initial encounter    Unspecified abdominal pain 05/10/2021    Flank pain, acute       PAST SURGICAL HISTORY:  Past Surgical History:   Procedure Laterality Date    CORONARY ANGIOPLASTY WITH STENT PLACEMENT  07/10/2014    Cath Placement Of Stent 1    KIDNEY SURGERY  07/10/2014    Kidney Surgery       FAMILY HISTORY  Family History   Problem Relation Name Age of Onset    Breast cancer Mother      Diabetes Mother      Alzheimer's disease Mother      Diabetes Father         FAMILY HISTORY: No changes since previous visit. Otherwise non-contributory as charted.       SOCIAL HISTORY  He  reports that he has never smoked. He has never used smokeless tobacco. He reports current alcohol use of about 2.0 standard drinks of alcohol per week. He reports that he does not currently use drugs.       PHYSICAL EXAM     VITAL SIGNS: There were no vitals taken for this visit.     PREVIOUS WEIGHTS:  Wt  Readings from Last 3 Encounters:   12/10/24 103 kg (226 lb)   10/31/24 102 kg (225 lb)   07/30/24 103 kg (226 lb)       Physical Exam  Physical Exam   Constitutional: Alert and oriented, cooperative, no obvious distress   HEENT: Non icteric or anemic, EOM WNL bilaterally   Neck: Supple, no JVD, no goiter, no adenopathy, no rigidity  Chest: CTA bilaterally, no wheezing, crackles, rubs   Cardiac: RRR, S1 and S2, no murmur, rub, thrill   Abdomen: Obese, Soft, nontender, no masses, no organomegaly   Extremities: No clubbing, no LL edema   Neuromuscular: Cranial nerves grossly intact, no focal deficits    RESULTS/DATA     Bicarbonate (mmol/L)   Date Value   10/30/2024 28   04/25/2024 26   10/19/2023 24     ECHO 3/10/2023:  [facility]  EF 59%     PAP Adherence:    Aircurve 10 ASV setup 4-26-21    Has not ordered from [facility] since 2022    ASSESSMENT/PLAN     Mr. Monreal is a 64 y.o. male and He returns in followup to the [facility] for VÍCTOR.    Problem List and Orders  Problem List Items Addressed This Visit             ICD-10-CM    VÍCTOR (obstructive sleep apnea) - Primary G47.33     3/12/2012 showing severe sleep apnea with an AHI of 65.7 and SpO2 sharon of 87%. The recommendation was for PAP titration. CPAP titration was completed on 4/11/2012. CPAP was titrated from 4 to 14. At higher pressures, central events developed. BiPAP was also attempted to 18/12. Persistence of events was observed at this setting. Recommendation was for ASV titration. ASV titration was completed on 5/7/2012 without success. Recommendation was for repeat ASV titration. ASV titration dated 5/19/2012 showed a recommended EPAP min of 11, EPAP max 15, PS min 6, PS max 14.   -high leak on current settings  -Adjusted EPAP range down to 6-12 due to weight loss and high leak.  -Supply order updated today. [employer] N30i provided for sample. Ryan was eager to try.   -RTC 12 mo or sooner if needed                 Relevant Orders    Positive Airway Pressure (PAP) Therapy    Treatment-emergent central sleep apnea G47.39     See VÍCTOR            Disposition    Return to clinic in 12 months

## 2025-01-21 NOTE — ASSESSMENT & PLAN NOTE
3/12/2012 showing severe sleep apnea with an AHI of 65.7 and SpO2 sharon of 87%. The recommendation was for PAP titration. CPAP titration was completed on 4/11/2012. CPAP was titrated from 4 to 14. At higher pressures, central events developed. BiPAP was also attempted to 18/12. Persistence of events was observed at this setting. Recommendation was for ASV titration. ASV titration was completed on 5/7/2012 without success. Recommendation was for repeat ASV titration. ASV titration dated 5/19/2012 showed a recommended EPAP min of 11, EPAP max 15, PS min 6, PS max 14.   -high leak on current settings  -Adjusted EPAP range down to 6-12 due to weight loss and high leak.  -Supply order updated today. [employer] N30i provided for sample. Ryan was eager to try.   -RTC 12 mo or sooner if needed

## 2025-02-11 ENCOUNTER — HOSPITAL ENCOUNTER (EMERGENCY)
Facility: HOSPITAL | Age: 65
Discharge: HOME | End: 2025-02-11
Attending: EMERGENCY MEDICINE
Payer: COMMERCIAL

## 2025-02-11 ENCOUNTER — TELEPHONE (OUTPATIENT)
Dept: PRIMARY CARE | Facility: CLINIC | Age: 65
End: 2025-02-11
Payer: COMMERCIAL

## 2025-02-11 ENCOUNTER — APPOINTMENT (OUTPATIENT)
Dept: RADIOLOGY | Facility: HOSPITAL | Age: 65
End: 2025-02-11
Payer: COMMERCIAL

## 2025-02-11 VITALS
WEIGHT: 223 LBS | RESPIRATION RATE: 16 BRPM | BODY MASS INDEX: 29.42 KG/M2 | HEART RATE: 65 BPM | DIASTOLIC BLOOD PRESSURE: 65 MMHG | TEMPERATURE: 98.4 F | SYSTOLIC BLOOD PRESSURE: 143 MMHG | OXYGEN SATURATION: 98 %

## 2025-02-11 DIAGNOSIS — R10.31 RIGHT LOWER QUADRANT ABDOMINAL PAIN: Primary | ICD-10-CM

## 2025-02-11 DIAGNOSIS — K92.1 BLOOD IN STOOL: ICD-10-CM

## 2025-02-11 LAB
ABO GROUP (TYPE) IN BLOOD: NORMAL
ALBUMIN SERPL BCP-MCNC: 4.2 G/DL (ref 3.4–5)
ALP SERPL-CCNC: 39 U/L (ref 33–136)
ALT SERPL W P-5'-P-CCNC: 39 U/L (ref 10–52)
ANION GAP SERPL CALC-SCNC: 15 MMOL/L (ref 10–20)
ANTIBODY SCREEN: NORMAL
APPEARANCE UR: CLEAR
APTT PPP: 30 SECONDS (ref 27–38)
AST SERPL W P-5'-P-CCNC: 30 U/L (ref 9–39)
BASOPHILS # BLD AUTO: 0.04 X10*3/UL (ref 0–0.1)
BASOPHILS NFR BLD AUTO: 0.7 %
BILIRUB SERPL-MCNC: 0.5 MG/DL (ref 0–1.2)
BILIRUB UR STRIP.AUTO-MCNC: NEGATIVE MG/DL
BUN SERPL-MCNC: 19 MG/DL (ref 6–23)
CALCIUM SERPL-MCNC: 9.6 MG/DL (ref 8.6–10.3)
CHLORIDE SERPL-SCNC: 104 MMOL/L (ref 98–107)
CO2 SERPL-SCNC: 24 MMOL/L (ref 21–32)
COLOR UR: ABNORMAL
CREAT SERPL-MCNC: 0.79 MG/DL (ref 0.5–1.3)
EGFRCR SERPLBLD CKD-EPI 2021: >90 ML/MIN/1.73M*2
EOSINOPHIL # BLD AUTO: 0.05 X10*3/UL (ref 0–0.7)
EOSINOPHIL NFR BLD AUTO: 0.9 %
ERYTHROCYTE [DISTWIDTH] IN BLOOD BY AUTOMATED COUNT: 13 % (ref 11.5–14.5)
GLUCOSE SERPL-MCNC: 157 MG/DL (ref 74–99)
GLUCOSE UR STRIP.AUTO-MCNC: ABNORMAL MG/DL
HCT VFR BLD AUTO: 52.8 % (ref 41–52)
HGB BLD-MCNC: 16.9 G/DL (ref 13.5–17.5)
IMM GRANULOCYTES # BLD AUTO: 0.07 X10*3/UL (ref 0–0.7)
IMM GRANULOCYTES NFR BLD AUTO: 1.2 % (ref 0–0.9)
INR PPP: 1.1 (ref 0.9–1.1)
KETONES UR STRIP.AUTO-MCNC: NEGATIVE MG/DL
LACTATE SERPL-SCNC: 2 MMOL/L (ref 0.4–2)
LEUKOCYTE ESTERASE UR QL STRIP.AUTO: NEGATIVE
LIPASE SERPL-CCNC: 68 U/L (ref 9–82)
LYMPHOCYTES # BLD AUTO: 1.78 X10*3/UL (ref 1.2–4.8)
LYMPHOCYTES NFR BLD AUTO: 31 %
MAGNESIUM SERPL-MCNC: 2.19 MG/DL (ref 1.6–2.4)
MCH RBC QN AUTO: 29.6 PG (ref 26–34)
MCHC RBC AUTO-ENTMCNC: 32 G/DL (ref 32–36)
MCV RBC AUTO: 93 FL (ref 80–100)
MONOCYTES # BLD AUTO: 0.63 X10*3/UL (ref 0.1–1)
MONOCYTES NFR BLD AUTO: 11 %
NEUTROPHILS # BLD AUTO: 3.17 X10*3/UL (ref 1.2–7.7)
NEUTROPHILS NFR BLD AUTO: 55.2 %
NITRITE UR QL STRIP.AUTO: NEGATIVE
NRBC BLD-RTO: 0 /100 WBCS (ref 0–0)
PH UR STRIP.AUTO: 5 [PH]
PLATELET # BLD AUTO: 138 X10*3/UL (ref 150–450)
POTASSIUM SERPL-SCNC: 4.7 MMOL/L (ref 3.5–5.3)
PROT SERPL-MCNC: 7.5 G/DL (ref 6.4–8.2)
PROT UR STRIP.AUTO-MCNC: NEGATIVE MG/DL
PROTHROMBIN TIME: 11.9 SECONDS (ref 9.8–12.8)
RBC # BLD AUTO: 5.71 X10*6/UL (ref 4.5–5.9)
RBC # UR STRIP.AUTO: NEGATIVE MG/DL
RH FACTOR (ANTIGEN D): NORMAL
SODIUM SERPL-SCNC: 138 MMOL/L (ref 136–145)
SP GR UR STRIP.AUTO: 1.02
UROBILINOGEN UR STRIP.AUTO-MCNC: NORMAL MG/DL
WBC # BLD AUTO: 5.7 X10*3/UL (ref 4.4–11.3)

## 2025-02-11 PROCEDURE — 86850 RBC ANTIBODY SCREEN: CPT | Performed by: PHYSICIAN ASSISTANT

## 2025-02-11 PROCEDURE — 80053 COMPREHEN METABOLIC PANEL: CPT | Performed by: PHYSICIAN ASSISTANT

## 2025-02-11 PROCEDURE — 85730 THROMBOPLASTIN TIME PARTIAL: CPT | Performed by: PHYSICIAN ASSISTANT

## 2025-02-11 PROCEDURE — 83605 ASSAY OF LACTIC ACID: CPT | Performed by: PHYSICIAN ASSISTANT

## 2025-02-11 PROCEDURE — 83690 ASSAY OF LIPASE: CPT | Performed by: PHYSICIAN ASSISTANT

## 2025-02-11 PROCEDURE — 86901 BLOOD TYPING SEROLOGIC RH(D): CPT | Performed by: PHYSICIAN ASSISTANT

## 2025-02-11 PROCEDURE — 36415 COLL VENOUS BLD VENIPUNCTURE: CPT | Performed by: PHYSICIAN ASSISTANT

## 2025-02-11 PROCEDURE — 2550000001 HC RX 255 CONTRASTS: Performed by: PHYSICIAN ASSISTANT

## 2025-02-11 PROCEDURE — 85025 COMPLETE CBC W/AUTO DIFF WBC: CPT | Performed by: PHYSICIAN ASSISTANT

## 2025-02-11 PROCEDURE — 85610 PROTHROMBIN TIME: CPT | Performed by: PHYSICIAN ASSISTANT

## 2025-02-11 PROCEDURE — 74177 CT ABD & PELVIS W/CONTRAST: CPT | Performed by: INTERNAL MEDICINE

## 2025-02-11 PROCEDURE — 74177 CT ABD & PELVIS W/CONTRAST: CPT

## 2025-02-11 PROCEDURE — 99285 EMERGENCY DEPT VISIT HI MDM: CPT | Mod: 25 | Performed by: EMERGENCY MEDICINE

## 2025-02-11 PROCEDURE — 83735 ASSAY OF MAGNESIUM: CPT | Performed by: PHYSICIAN ASSISTANT

## 2025-02-11 PROCEDURE — 81003 URINALYSIS AUTO W/O SCOPE: CPT

## 2025-02-11 RX ADMIN — IOHEXOL 75 ML: 350 INJECTION, SOLUTION INTRAVENOUS at 12:25

## 2025-02-11 ASSESSMENT — COLUMBIA-SUICIDE SEVERITY RATING SCALE - C-SSRS
6. HAVE YOU EVER DONE ANYTHING, STARTED TO DO ANYTHING, OR PREPARED TO DO ANYTHING TO END YOUR LIFE?: NO
1. IN THE PAST MONTH, HAVE YOU WISHED YOU WERE DEAD OR WISHED YOU COULD GO TO SLEEP AND NOT WAKE UP?: NO
2. HAVE YOU ACTUALLY HAD ANY THOUGHTS OF KILLING YOURSELF?: NO

## 2025-02-11 ASSESSMENT — LIFESTYLE VARIABLES
EVER FELT BAD OR GUILTY ABOUT YOUR DRINKING: NO
HAVE PEOPLE ANNOYED YOU BY CRITICIZING YOUR DRINKING: NO
EVER HAD A DRINK FIRST THING IN THE MORNING TO STEADY YOUR NERVES TO GET RID OF A HANGOVER: NO
HAVE YOU EVER FELT YOU SHOULD CUT DOWN ON YOUR DRINKING: NO
TOTAL SCORE: 0

## 2025-02-11 NOTE — ED TRIAGE NOTES
The patient was seen and examined in triage.    History of Present Illness: The patient is a 64-year-old male presents emergency department due to rectal bleeding that started this morning.  He reports that he moved his bowels and the stool was very dark and there is also bright red blood mixed in it.  He reports that another episode occurred today.  He reports he called his PCP who recommended he go to the ER.  He is not any anticoagulants.  Denies nausea vomiting.  Denies fevers.  He does report diffuse abdominal discomfort.  He denies daily NSAID usage.  He does report colonoscopy in which she is likely due for repeat.    Brief Physical Exam:  Exam is limited by the patient sitting in a chair in triage.   Heart: Regular rate and rhythm.   Lungs: Clear to auscultation bilaterally.   Abdomen: Soft, nondistended.  Diffuse tenderness throughout.  No signs of peritonitis    Plan: Appropriate labs and diagnostic imaging were ordered.      For the remainder of the patient's workup and ED course, please refer to the main ED provider note. We discussed need for diagnostic testing including laboratory studies and imaging.  We also discussed that they may be asked to wait in the waiting room while these tests are pending.  They understand that if they choose to leave without having the testing completed or resulted that we cannot rule out acute life threatening illnesses and the risks involved could lead to worsening condition, permanent disability or even death.      Disclaimer: This note was dictated by speech recognition. Minor errors in transcription may be present. Please call if questions.

## 2025-02-11 NOTE — ED PROVIDER NOTES
History of Present Illness     History provided by: Patient   Limitations to History: None   External Records Reviewed with Brief Summary: Reviewed primary care visit from 12/10/2024 where he was evaluated for abdominal pain and diarrhea.    HPI:  Ryan Monreal is a 64 y.o. male with a PMH of hemorrhoids, previous MI with 2 stents placed, GERD, who is presenting to the ED today with concern for dark tarry stools and bright red per rectum. Had 2 bloody stools this morning, denies previous similar symptoms. Has had abdominal pain but not associated with or alleviated by defecation. No nausea, vomiting, chest pain, or fever/chills. Has had some burning with urination as well. Unsure when last colonoscopy was. Denies chronic NSAID use. Had a recent flu like illness 2 weeks ago which has since resolved. No other associated signs or symptoms reported at this time.     Physical Exam   Triage vitals:  T 36.3 °C (97.3 °F)  HR 59  /77  RR 18  O2 96 % None (Room air)    General: Awake, alert, in no acute distress  Eyes: Gaze conjugate.  No scleral icterus or injection. Conjunctiva is not pale.  HENT: Normo-cephalic, atraumatic. No stridor  CV: Regular rate, regular rhythm. Radial pulses 2+ bilaterally  Resp: Breathing non-labored, speaking in full sentences.  Clear to auscultation bilaterally  GI: Soft, non-distended, minimal tenderness to palpation in the RLQ. No rebound or guarding.  : Chaperoned rectal exam, bright red blood per rectum, appears to be a bleeding external hemorrhoid.  MSK/Extremities: No gross bony deformities. Moving all extremities  Skin: Warm. Appropriate color  Neuro: Alert. Oriented. Face symmetric. Speech is fluent.  Gross strength and sensation intact in b/l UE and LEs  Psych: Appropriate mood and affect    Medical Decision Making & ED Course   Medical Decision Makin y.o. male with the above past medical hx presenting to the ED with concern for dark stools and bright red blood per  rectum. Had work up initiated out in triage. Cbc including hemoglobin otherwise reassuring. Slight thrombocytopenia. Labs including cr and urea otherwise reassuring. Not consistent with upper GI bleed. CT imaging with IV contrast was obtained that showed sigmoid diverticulosis without diverticulitis and non obstructing renal stone measuring 7mm. No evidence of appenidicitis. Lipase is normal low concern for pancreatitis. Active bowel sounds and imaging otherwise reassuring and inconsistent with SBO. Spoke to patient about outpatient follow up with GI for endoscopy +/- colonoscopy. Patient agreeable with plan. Strict return precautions and follow up instructions provided and patient agreeable.  ----    Differential diagnoses considered include but are not limited to: Pancreatitis, SBO, Hemorrhoids, diverticulitis, UGI bleed, LGI bleed     Social Determinants of Health which Significantly Impact Care: None identified     EKG Independent Interpretation: See ED Course    Independent Result Review and Interpretation: See ED course    Chronic conditions affecting the patient's care: See HPI    The patient was discussed with the following consultants/services: None    Care Considerations: See [facility]    ED Course:  ED Course as of 02/11/25 1853   Tue Feb 11, 2025   1847 [place] score of 3, with 98% chance of safe discharge home. [KD]      ED Course User Index  [KD] Misty Childers DO         Diagnoses as of 02/11/25 1853   Right lower quadrant abdominal pain   Blood in stool     Disposition   As a result of the work-up, the patient was discharged home.  he was informed of his diagnosis and instructed to come back with any concerns or worsening of condition.  he and was agreeable to the plan as discussed above.  he was given the opportunity to ask questions.  All of the patient's questions were answered.    Seen and discussed with ED Attending  Misty Childers DO, PGY-2  Emergency Medicine     Misty Childers,  DO  Resident  02/11/25 [id number]

## 2025-02-11 NOTE — TELEPHONE ENCOUNTER
Pt states he woke up this morning and had black stool with a lot of blood. Advised pt to go to ER for evaluation.

## 2025-02-11 NOTE — DISCHARGE INSTRUCTIONS
It is important for you to schedule an appointment and follow up with your primary care doctor if you continue to have bleeding with bowel movements. You do have bleeding hemorrhoids which appear to be external however if the bleeding continues, you start to feel dizzy, have palpitations, feel lightheaded or any other new or concerning symptoms please present back to the ED or follow up with your PCP for repeat blood draws. Please follow up with GI as scheduled and continue to call and see if a sooner appointment is available. Also continue to take the omeprazole as prescribed.

## 2025-02-11 NOTE — ED TRIAGE NOTES
Pt presents to ED for report of two episodes of black stools that began this morning. Pt reports lower abdominal discomfort. Pt denies n/v. Denies thinners.

## 2025-02-11 NOTE — Clinical Note
Ryan Monreal was seen and treated in our emergency department on 2/11/2025.  He may return to work on 02/13/2025.       If you have any questions or concerns, please don't hesitate to call.      Misty Childers, DO

## 2025-02-12 LAB — HOLD SPECIMEN: NORMAL

## 2025-03-05 ENCOUNTER — APPOINTMENT (OUTPATIENT)
Dept: GASTROENTEROLOGY | Facility: CLINIC | Age: 65
End: 2025-03-05
Payer: COMMERCIAL

## 2025-03-05 VITALS
SYSTOLIC BLOOD PRESSURE: 122 MMHG | TEMPERATURE: 97.9 F | HEIGHT: 73 IN | BODY MASS INDEX: 30.31 KG/M2 | WEIGHT: 228.7 LBS | DIASTOLIC BLOOD PRESSURE: 72 MMHG | HEART RATE: 56 BPM | RESPIRATION RATE: 20 BRPM

## 2025-03-05 DIAGNOSIS — K92.1 BLOOD IN STOOL: ICD-10-CM

## 2025-03-05 DIAGNOSIS — R10.31 RIGHT LOWER QUADRANT ABDOMINAL PAIN: ICD-10-CM

## 2025-03-05 PROCEDURE — 3078F DIAST BP <80 MM HG: CPT | Performed by: INTERNAL MEDICINE

## 2025-03-05 PROCEDURE — 99203 OFFICE O/P NEW LOW 30 MIN: CPT | Performed by: INTERNAL MEDICINE

## 2025-03-05 PROCEDURE — 4010F ACE/ARB THERAPY RXD/TAKEN: CPT | Performed by: INTERNAL MEDICINE

## 2025-03-05 PROCEDURE — 3008F BODY MASS INDEX DOCD: CPT | Performed by: INTERNAL MEDICINE

## 2025-03-05 PROCEDURE — 3074F SYST BP LT 130 MM HG: CPT | Performed by: INTERNAL MEDICINE

## 2025-03-05 PROCEDURE — 99213 OFFICE O/P EST LOW 20 MIN: CPT | Performed by: INTERNAL MEDICINE

## 2025-03-05 RX ORDER — HYDROCORTISONE 25 MG/G
CREAM TOPICAL NIGHTLY
Qty: 30 G | Refills: 11 | Status: SHIPPED | OUTPATIENT
Start: 2025-03-05

## 2025-03-05 ASSESSMENT — PAIN SCALES - GENERAL: PAINLEVEL_OUTOF10: 2

## 2025-03-05 NOTE — PATIENT INSTRUCTIONS
Given the bleeding and black stool we would recommend that you have an endoscopy and colonoscopy. This will be scheduled at [facility] for May. Be sure to follow the prep instructions and bring a . Let us know if you're bleeding reoccurs prior. We will give you a cream to use topically.

## 2025-03-05 NOTE — PROGRESS NOTES
History Of Present Illness  Ryan Monreal is a 64 y.o. male presenting with rectal bleeding.    He was seen in the emergency room on February 11 with a 1 day history of maroon stool.  He also had some vague lower abdominal pain as well as a history of black stool.  On evaluation he was hemodynamically stable and not tachycardic.  He did have an external hemorrhoids seen on rectal exam.  Labs are notable for a normal BUN and creatinine and a normal hemoglobin.  He had a CT scan of the abdomen pelvis that was negative for active bleeding and was normal except for colonic diverticulosis.  Since that time he has seen occasional streaks of blood on the toilet tissue but no major bleeding.  His bowel movements are regular without significant straining.  He has had no nausea, vomiting, or early satiety.  He had an EGD and colonoscopy in 2016.  I do not have the EGD report but biopsies were negative for H. pylori.  The colonoscopy was notable for a diminutive polyp in the cecum that was lymphoid hyperplasia on pathology.  He was also found to have diverticulosis.  There is no known family history of gastrointestinal problems. There is a history of mild thrombocytopenia that is cjhronic and has been evaluated in the past with bone narrow biopsy that did not show any significant findings.         Past Medical History  Past Medical History:   Diagnosis Date    Abnormal auditory function study 06/08/2017    Abnormal hearing screen    Acute frontal sinusitis, unspecified 10/11/2016    Acute non-recurrent frontal sinusitis    Acute upper respiratory infection, unspecified 03/28/2022    Viral URI with cough    Adjustment insomnia 10/11/2016    Transient insomnia    Body mass index (BMI) 33.0-33.9, adult 05/16/2019    BMI 33.0-33.9,adult    Contact with and (suspected) exposure to covid-19 03/28/2022    Suspected COVID-19 virus infection    Crossing vessel and stricture of ureter without hydronephrosis 05/10/2021    Ureteral  stricture, right    Crossing vessel and stricture of ureter without hydronephrosis 01/05/2022    Ureteral stricture, right    Essential (primary) hypertension 07/26/2013    Benign essential hypertension    Hallux valgus (acquired), unspecified foot 02/15/2021    Hallux valgus with bunions    Old myocardial infarction 07/26/2013    Past myocardial infarction    Other conditions influencing health status 10/15/2018    Uncontrolled type 2 diabetes mellitus without complication, without long-term current use of insulin    Other sleep apnea 03/17/2021    Treatment-emergent central sleep apnea    Pain in left ankle and joints of left foot 05/22/2019    Left ankle pain    Pain in right ankle and joints of right foot 06/05/2020    Right ankle pain    Pain in right knee 01/12/2016    Right knee pain    Personal history of diseases of the blood and blood-forming organs and certain disorders involving the immune mechanism 01/24/2017    History of thrombocytopenia    Personal history of other diseases of the circulatory system 04/28/2015    History of sinus bradycardia    Personal history of other diseases of the digestive system 06/08/2017    History of gastritis    Personal history of other diseases of the digestive system 10/22/2020    History of bloody stools    Personal history of other diseases of the nervous system and sense organs 06/08/2017    History of hearing loss    Personal history of other diseases of the respiratory system 01/16/2018    History of acute sinusitis    Personal history of other endocrine, nutritional and metabolic disease 09/08/2014    History of diabetes mellitus    Personal history of other endocrine, nutritional and metabolic disease 05/16/2019    History of type 2 diabetes mellitus    Personal history of other endocrine, nutritional and metabolic disease 10/25/2016    History of diabetes mellitus    Personal history of other infectious and parasitic diseases 02/22/2016    History of  "Helicobacter infection    Personal history of other specified conditions 04/23/2018    History of postnasal drip    Personal history of other specified conditions 11/05/2020    History of abdominal pain    Personal history of other specified conditions 01/06/2022    History of right flank pain    Personal history of urinary calculi 06/04/2015    History of renal calculi    Primary central sleep apnea 09/03/2015    Sleep apnea, primary central    Sprain of unspecified ligament of right ankle, initial encounter 06/05/2020    Right ankle sprain    Strain of unspecified muscle, fascia and tendon at wrist and hand level, right hand, initial encounter 04/23/2021    Strain of right wrist, initial encounter    Strain of unspecified muscle, fascia and tendon at wrist and hand level, right hand, initial encounter 04/23/2021    Strain of right hand, initial encounter    Unspecified abdominal pain 05/10/2021    Flank pain, acute       Surgical History  Past Surgical History:   Procedure Laterality Date    CORONARY ANGIOPLASTY WITH STENT PLACEMENT  07/10/2014    Cath Placement Of Stent 1    KIDNEY SURGERY  07/10/2014    Kidney Surgery        Social History  He reports that he has never smoked. He has never used smokeless tobacco. He reports current alcohol use of about 2.0 standard drinks of alcohol per week. He reports that he does not currently use drugs.    Family History  Family History   Problem Relation Name Age of Onset    Breast cancer Mother      Diabetes Mother      Alzheimer's disease Mother      Diabetes Father          Allergies  Patient has no known allergies.    Last Recorded Vitals  /72 (BP Location: Left arm, Patient Position: Sitting, BP Cuff Size: Adult)   Pulse 56   Temp 36.6 °C (97.9 °F) (Temporal)   Resp 20   Ht 1.854 m (6' 1\")   Wt 104 kg (228 lb 11.2 oz)   BMI 30.17 kg/m²        Physical Exam  Vitals reviewed.   Constitutional:       Appearance: Normal appearance.   Cardiovascular:      Rate " and Rhythm: Normal rate and regular rhythm.   Pulmonary:      Effort: Pulmonary effort is normal.      Breath sounds: Normal breath sounds.   Abdominal:      General: Bowel sounds are normal.      Palpations: Abdomen is soft. There is no mass.      Tenderness: There is no abdominal tenderness.   Neurological:      Mental Status: He is alert.           Labs  Lab Results   Component Value Date    GLUCOSE 157 (H) 02/11/2025    CALCIUM 9.6 02/11/2025     02/11/2025    K 4.7 02/11/2025    CO2 24 02/11/2025     02/11/2025    BUN 19 02/11/2025    CREATININE 0.79 02/11/2025     Lab Results   Component Value Date    WBC 5.7 02/11/2025    HGB 16.9 02/11/2025    HCT 52.8 (H) 02/11/2025    MCV 93 02/11/2025     (L) 02/11/2025     02/11/2025    K 4.7 02/11/2025     02/11/2025    CO2 24 02/11/2025    BUN 19 02/11/2025    CREATININE 0.79 02/11/2025    CALCIUM 9.6 02/11/2025    PROT 7.5 02/11/2025    BILITOT 0.5 02/11/2025    ALKPHOS 39 02/11/2025    ALT 39 02/11/2025    AST 30 02/11/2025    GLUCOSE 157 (H) 02/11/2025           Imaging     CT abdomen pelvis w IV contrast    Result Date: 2/11/2025  Interpreted By:  Dasha Gamboa, STUDY: CT ABDOMEN PELVIS W IV CONTRAST;  2/11/2025 12:24 pm   INDICATION: Signs/Symptoms:ABDOMINAL PAIN, RECTAL BLEEDING.     COMPARISON: CT urography 12/04/2020 CT abdomen pelvis 10/07/2014   ACCESSION NUMBER(S): [id number]   ORDERING CLINICIAN: OMAR TREJO   TECHNIQUE: CT of the abdomen and pelvis was performed.  Standard contiguous axial images were obtained at 3 mm slice thickness through the abdomen and pelvis. Coronal and sagittal reconstructions at 3 mm slice thickness were performed.  75 ML of Omnipaque 350 was administered intravenously without immediate complication.   FINDINGS: LOWER CHEST: The heart is normal in size without evidence of pericardial effusion. Bibasilar atelectasis. No consolidation or pleural effusion. No concerning lung nodule. The visualized  distal esophagus appears unremarkable.   ABDOMEN:   LIVER: The liver is normal in size. No suspicious liver lesion.   BILE DUCTS: No biliary duct dilatation.   GALLBLADDER: The gallbladder is nondistended and without evidence of radiopaque stones.   PANCREAS: The pancreas appears unremarkable without evidence of ductal dilatation or masses.   SPLEEN: The spleen is normal in size.   ADRENAL GLANDS: No adrenal nodularity or thickening.   KIDNEYS AND URETERS: The kidneys are normal in size. Stable hypoattenuating lesions in the bilateral kidneys, likely benign. Stable nonobstructive right kidney nephrolithiasis, measuring up to 7 mm. No hydroureteronephrosis or left nephrolithiasis.   PELVIS:   BLADDER: The urinary bladder appears normal without abnormal wall thickening.   REPRODUCTIVE ORGANS: No pelvic masses.   BOWEL: The stomach is unremarkable. The small and large bowel are normal in caliber and demonstrate no wall thickening. Normal appendix. Prominent sigmoid diverticulosis without acute diverticulitis.   VESSELS: Moderate atherosclerotic calcifications of the aortoiliac arteries. No aneurysmal dilatation of the abdominal aorta. The IVC appears normal.   PERITONEUM/RETROPERITONEUM/LYMPH NODES: No ascites or fluid collection. The retroperitoneum is unremarkable. No abdominopelvic lymphadenopathy.   ABDOMINAL WALL: The abdominal wall soft tissues appear normal.   BONES: No acute fracture or malalignment. No suspicious osseous lesions. Discogenic degenerative changes in the lower thoracic and lumbar spine. Unchanged bilateral pars defects with grade 2 anterolisthesis of the L5 on S1 vertebral body.       1. No acute abdominopelvic abnormality to explain patient's pain. 2. There is no CT correlate for patient's rectal bleeding, although this exam is not tailored to evaluate for GI bleed. If there remains clinical concern for acute GI bleed, further evaluation with dedicated triple phase CT angio for GI bleed or  nuclear medicine GI bleed study is recommended. 3. Additional chronic and incidental findings as detailed above.     MACRO: None   Signed by: Dasha Gamboa 2/11/2025 1:07 PM Dictation workstation:   [id number]    CT abdomen pelvis w IV contrast  Narrative: Interpreted By:  Dasha Gamboa,   STUDY:  CT ABDOMEN PELVIS W IV CONTRAST;  2/11/2025 12:24 pm      INDICATION:  Signs/Symptoms:ABDOMINAL PAIN, RECTAL BLEEDING.          COMPARISON:  CT urography 12/04/2020  CT abdomen pelvis 10/07/2014      ACCESSION NUMBER(S):  [id number]      ORDERING CLINICIAN:  OMAR TREJO      TECHNIQUE:  CT of the abdomen and pelvis was performed.  Standard contiguous  axial images were obtained at 3 mm slice thickness through the  abdomen and pelvis. Coronal and sagittal reconstructions at 3 mm  slice thickness were performed.  75 ML of Omnipaque 350 was  administered intravenously without immediate complication.      FINDINGS:  LOWER CHEST:  The heart is normal in size without evidence of pericardial effusion.  Bibasilar atelectasis. No consolidation or pleural effusion.  No concerning lung nodule.  The visualized distal esophagus appears unremarkable.      ABDOMEN:      LIVER:  The liver is normal in size.  No suspicious liver lesion.      BILE DUCTS:  No biliary duct dilatation.      GALLBLADDER:  The gallbladder is nondistended and without evidence of radiopaque  stones.      PANCREAS:  The pancreas appears unremarkable without evidence of ductal  dilatation or masses.      SPLEEN:  The spleen is normal in size.      ADRENAL GLANDS:  No adrenal nodularity or thickening.      KIDNEYS AND URETERS:  The kidneys are normal in size. Stable hypoattenuating lesions in the  bilateral kidneys, likely benign. Stable nonobstructive right kidney  nephrolithiasis, measuring up to 7 mm. No hydroureteronephrosis or  left nephrolithiasis.      PELVIS:      BLADDER:  The urinary bladder appears normal without abnormal wall thickening.       REPRODUCTIVE ORGANS:  No pelvic masses.      BOWEL:  The stomach is unremarkable. The small and large bowel are normal in  caliber and demonstrate no wall thickening. Normal appendix.  Prominent sigmoid diverticulosis without acute diverticulitis.      VESSELS:  Moderate atherosclerotic calcifications of the aortoiliac arteries.  No aneurysmal dilatation of the abdominal aorta.  The IVC appears normal.      PERITONEUM/RETROPERITONEUM/LYMPH NODES:  No ascites or fluid collection.  The retroperitoneum is unremarkable.  No abdominopelvic lymphadenopathy.      ABDOMINAL WALL:  The abdominal wall soft tissues appear normal.      BONES:  No acute fracture or malalignment.  No suspicious osseous lesions.  Discogenic degenerative changes in the lower thoracic and lumbar  spine. Unchanged bilateral pars defects with grade 2 anterolisthesis  of the L5 on S1 vertebral body.      Impression: 1. No acute abdominopelvic abnormality to explain patient's pain.  2. There is no CT correlate for patient's rectal bleeding, although  this exam is not tailored to evaluate for GI bleed. If there remains  clinical concern for acute GI bleed, further evaluation with  dedicated triple phase CT angio for GI bleed or nuclear medicine GI  bleed study is recommended.  3. Additional chronic and incidental findings as detailed above.          MACRO:  None      Signed by: Dasha Gamboa 2/11/2025 1:07 PM  Dictation workstation:   [id number]         ASSESSMENT & PLAN  Problem List Items Addressed This Visit    None  Visit Diagnoses         Codes    Right lower quadrant abdominal pain     R10.31    Blood in stool     K92.1    Relevant Medications    hydrocortisone (Anusol-HC) 2.5 % rectal cream    Other Relevant Orders    Colonoscopy Screening; High Risk Patient    Esophagogastroduodenoscopy (EGD)          The bleeding has resolved and was likely perianal versus diverticular.  Although there is no evidence of upper GI bleeding given the black  stool be worth evaluating the upper GI tract as well.  He will be scheduled for an EGD and colonoscopy.  He will be out of town until May and we will schedule it after that time.  In the interim he was given a prescription for hydrocortisone cream to use for perianal discomfort.  He knows to seek medical attention should he have another episode of significant bleeding.    I spent 20 minutes in the professional and overall care of this patient.      Tony Kiser MD

## 2025-03-28 ENCOUNTER — APPOINTMENT (OUTPATIENT)
Dept: SLEEP MEDICINE | Facility: CLINIC | Age: 65
End: 2025-03-28
Payer: COMMERCIAL

## 2025-04-17 ENCOUNTER — APPOINTMENT (OUTPATIENT)
Dept: GASTROENTEROLOGY | Facility: CLINIC | Age: 65
End: 2025-04-17
Payer: COMMERCIAL

## 2025-04-28 DIAGNOSIS — E78.5 DM TYPE 2 WITH DIABETIC DYSLIPIDEMIA (MULTI): ICD-10-CM

## 2025-04-28 DIAGNOSIS — K21.9 GASTROESOPHAGEAL REFLUX DISEASE WITHOUT ESOPHAGITIS: ICD-10-CM

## 2025-04-28 DIAGNOSIS — E11.69 DM TYPE 2 WITH DIABETIC DYSLIPIDEMIA (MULTI): ICD-10-CM

## 2025-04-28 DIAGNOSIS — I10 HTN (HYPERTENSION), BENIGN: ICD-10-CM

## 2025-04-28 DIAGNOSIS — E78.2 MIXED DYSLIPIDEMIA: ICD-10-CM

## 2025-04-30 DIAGNOSIS — E11.69 DM TYPE 2 WITH DIABETIC DYSLIPIDEMIA (MULTI): ICD-10-CM

## 2025-04-30 DIAGNOSIS — E78.5 DM TYPE 2 WITH DIABETIC DYSLIPIDEMIA (MULTI): ICD-10-CM

## 2025-04-30 RX ORDER — EMPAGLIFLOZIN 25 MG/1
25 TABLET, FILM COATED ORAL DAILY
Qty: 90 TABLET | Refills: 0 | Status: SHIPPED | OUTPATIENT
Start: 2025-04-30

## 2025-04-30 RX ORDER — LOSARTAN POTASSIUM 50 MG/1
50 TABLET ORAL DAILY
Qty: 90 TABLET | Refills: 0 | Status: SHIPPED | OUTPATIENT
Start: 2025-04-30

## 2025-04-30 RX ORDER — ROSUVASTATIN CALCIUM 20 MG/1
20 TABLET, COATED ORAL DAILY
Qty: 90 TABLET | Refills: 0 | Status: SHIPPED | OUTPATIENT
Start: 2025-04-30

## 2025-04-30 RX ORDER — OMEPRAZOLE 40 MG/1
CAPSULE, DELAYED RELEASE ORAL
Qty: 90 CAPSULE | Refills: 0 | Status: SHIPPED | OUTPATIENT
Start: 2025-04-30

## 2025-04-30 RX ORDER — METFORMIN HYDROCHLORIDE 500 MG/1
TABLET, EXTENDED RELEASE ORAL
Qty: 360 TABLET | Refills: 0 | Status: SHIPPED | OUTPATIENT
Start: 2025-04-30

## 2025-04-30 RX ORDER — METOPROLOL TARTRATE 100 MG/1
100 TABLET ORAL 2 TIMES DAILY
Qty: 180 TABLET | Refills: 0 | Status: SHIPPED | OUTPATIENT
Start: 2025-04-30

## 2025-05-02 ENCOUNTER — APPOINTMENT (OUTPATIENT)
Dept: PRIMARY CARE | Facility: CLINIC | Age: 65
End: 2025-05-02

## 2025-05-02 VITALS
HEIGHT: 73 IN | OXYGEN SATURATION: 97 % | SYSTOLIC BLOOD PRESSURE: 118 MMHG | RESPIRATION RATE: 12 BRPM | DIASTOLIC BLOOD PRESSURE: 64 MMHG | WEIGHT: 219 LBS | BODY MASS INDEX: 29.03 KG/M2 | HEART RATE: 70 BPM

## 2025-05-02 DIAGNOSIS — E11.69 DM TYPE 2 WITH DIABETIC DYSLIPIDEMIA (MULTI): ICD-10-CM

## 2025-05-02 DIAGNOSIS — Z00.00 HEALTHCARE MAINTENANCE: Primary | ICD-10-CM

## 2025-05-02 DIAGNOSIS — E78.5 DM TYPE 2 WITH DIABETIC DYSLIPIDEMIA (MULTI): ICD-10-CM

## 2025-05-02 PROCEDURE — 3074F SYST BP LT 130 MM HG: CPT | Performed by: FAMILY MEDICINE

## 2025-05-02 PROCEDURE — 3008F BODY MASS INDEX DOCD: CPT | Performed by: FAMILY MEDICINE

## 2025-05-02 PROCEDURE — 1036F TOBACCO NON-USER: CPT | Performed by: FAMILY MEDICINE

## 2025-05-02 PROCEDURE — 4010F ACE/ARB THERAPY RXD/TAKEN: CPT | Performed by: FAMILY MEDICINE

## 2025-05-02 PROCEDURE — 99396 PREV VISIT EST AGE 40-64: CPT | Performed by: FAMILY MEDICINE

## 2025-05-02 PROCEDURE — 3078F DIAST BP <80 MM HG: CPT | Performed by: FAMILY MEDICINE

## 2025-05-02 RX ORDER — SEMAGLUTIDE 0.68 MG/ML
0.5 INJECTION, SOLUTION SUBCUTANEOUS
Qty: 9 ML | Refills: 0 | OUTPATIENT
Start: 2025-05-04

## 2025-05-02 RX ORDER — SEMAGLUTIDE 0.68 MG/ML
0.5 INJECTION, SOLUTION SUBCUTANEOUS
Qty: 9 ML | Refills: 0 | Status: SHIPPED | OUTPATIENT
Start: 2025-05-04

## 2025-05-02 ASSESSMENT — ENCOUNTER SYMPTOMS
FEVER: 0
BLOOD IN STOOL: 0
EYE PAIN: 0
CONSTIPATION: 0
ABDOMINAL DISTENTION: 0
DYSURIA: 0
FATIGUE: 0
BRUISES/BLEEDS EASILY: 0
APPETITE CHANGE: 0
SORE THROAT: 0
ARTHRALGIAS: 0
CHILLS: 0
SHORTNESS OF BREATH: 0
BACK PAIN: 0
ADENOPATHY: 0
DIARRHEA: 0
HEADACHES: 0
DIZZINESS: 0
DIFFICULTY URINATING: 0
NERVOUS/ANXIOUS: 0
DYSPHORIC MOOD: 0
WEAKNESS: 0
COUGH: 0
EYE REDNESS: 0
ABDOMINAL PAIN: 0
CHEST TIGHTNESS: 0

## 2025-05-02 ASSESSMENT — PATIENT HEALTH QUESTIONNAIRE - PHQ9
9. THOUGHTS THAT YOU WOULD BE BETTER OFF DEAD, OR OF HURTING YOURSELF: NOT AT ALL
7. TROUBLE CONCENTRATING ON THINGS, SUCH AS READING THE NEWSPAPER OR WATCHING TELEVISION: NOT AT ALL
3. TROUBLE FALLING OR STAYING ASLEEP OR SLEEPING TOO MUCH: NOT AT ALL
5. POOR APPETITE OR OVEREATING: NOT AT ALL
6. FEELING BAD ABOUT YOURSELF - OR THAT YOU ARE A FAILURE OR HAVE LET YOURSELF OR YOUR FAMILY DOWN: NOT AT ALL
2. FEELING DOWN, DEPRESSED OR HOPELESS: NOT AT ALL
1. LITTLE INTEREST OR PLEASURE IN DOING THINGS: NOT AT ALL
4. FEELING TIRED OR HAVING LITTLE ENERGY: NOT AT ALL
SUM OF ALL RESPONSES TO PHQ QUESTIONS 1-9: 0
SUM OF ALL RESPONSES TO PHQ9 QUESTIONS 1 AND 2: 0
10. IF YOU CHECKED OFF ANY PROBLEMS, HOW DIFFICULT HAVE THESE PROBLEMS MADE IT FOR YOU TO DO YOUR WORK, TAKE CARE OF THINGS AT HOME, OR GET ALONG WITH OTHER PEOPLE: NOT DIFFICULT AT ALL
8. MOVING OR SPEAKING SO SLOWLY THAT OTHER PEOPLE COULD HAVE NOTICED. OR THE OPPOSITE, BEING SO FIGETY OR RESTLESS THAT YOU HAVE BEEN MOVING AROUND A LOT MORE THAN USUAL: NOT AT ALL

## 2025-05-02 NOTE — PROGRESS NOTES
"Subjective   Patient ID: Ryan Monreal is a 65 y.o. male who presents for Annual Exam.    HPI     Review of Systems    Objective   /64   Pulse 70   Resp 12   Ht 1.854 m (6' 1\")   Wt 99.3 kg (219 lb)   SpO2 97%   BMI 28.89 kg/m²     Physical Exam    Assessment/Plan          "

## 2025-05-02 NOTE — PROGRESS NOTES
"Subjective   Patient ID: Ryan Monreal is a 65 y.o. male who presents for Annual Exam.  PMHX, PSHx, Fam hx, and Social hx reviewed.   New concerns none  Vaccines - RSV vaccine recommended  Dentist seen at least yearly yes  Vision concerns none  Hearing concerns none  Diet is usually overall healthy.   Smoker - no  Lung CT/screening - NA  AAA screening - NA  Alcohol use - averages 0-1 drinks per week  Exercising 5 days per week, walking  Sexually active - yes, having trouble with ED.  Colonoscopy 2016, due in June                 Review of Systems   Constitutional:  Negative for appetite change, chills, fatigue and fever.   HENT:  Negative for congestion, hearing loss and sore throat.    Eyes:  Negative for pain, redness and visual disturbance.   Respiratory:  Negative for cough, chest tightness and shortness of breath.    Cardiovascular:  Negative for chest pain and leg swelling.   Gastrointestinal:  Negative for abdominal distention, abdominal pain, blood in stool, constipation and diarrhea.   Genitourinary:  Negative for difficulty urinating and dysuria.   Musculoskeletal:  Negative for arthralgias and back pain.   Skin:  Negative for rash.   Neurological:  Negative for dizziness, weakness and headaches.   Hematological:  Negative for adenopathy. Does not bruise/bleed easily.   Psychiatric/Behavioral:  Negative for dysphoric mood. The patient is not nervous/anxious.        Objective   /65   Pulse 70   Resp 12   Ht 1.854 m (6' 1\")   Wt 99.3 kg (219 lb)   SpO2 97%   BMI 28.89 kg/m²    Physical Exam  Constitutional:       General: He is not in acute distress.     Appearance: Normal appearance. He is not ill-appearing.   HENT:      Head: Normocephalic and atraumatic.      Right Ear: Tympanic membrane, ear canal and external ear normal. There is no impacted cerumen.      Left Ear: Tympanic membrane, ear canal and external ear normal.      Nose: Congestion and rhinorrhea present.      Mouth/Throat:      " Mouth: Mucous membranes are moist.      Pharynx: No oropharyngeal exudate or posterior oropharyngeal erythema.   Eyes:      Extraocular Movements: Extraocular movements intact.      Conjunctiva/sclera: Conjunctivae normal.      Pupils: Pupils are equal, round, and reactive to light.   Neck:      Thyroid: No thyroid mass or thyromegaly.      Vascular: No carotid bruit.   Cardiovascular:      Rate and Rhythm: Normal rate and regular rhythm.      Heart sounds: Normal heart sounds. No murmur heard.  Pulmonary:      Breath sounds: Normal breath sounds. No wheezing, rhonchi or rales.   Abdominal:      General: Bowel sounds are normal. There is no distension.      Palpations: Abdomen is soft. There is no mass.      Tenderness: There is no abdominal tenderness.   Genitourinary:     Rectum: Guaiac result negative.      Comments: ~ 40 g prostate without nodules or asymmetry   Musculoskeletal:         General: No swelling or deformity.      Cervical back: Neck supple. No tenderness.   Lymphadenopathy:      Cervical: No cervical adenopathy.   Skin:     General: Skin is warm and dry.      Findings: No lesion or rash.   Neurological:      Mental Status: He is alert and oriented to person, place, and time.      Sensory: No sensory deficit.      Motor: No weakness.      Coordination: Coordination normal.      Deep Tendon Reflexes: Reflexes normal.   Psychiatric:         Mood and Affect: Mood normal.         Behavior: Behavior normal.         Judgment: Judgment normal.           Assessment/Plan   Diagnoses and all orders for this visit:  Healthcare maintenance - vaccines current, could get HPV vaccine at pharmacy. Labs pending. Colonoscopy planned in June.  DM type 2 with diabetic  - labs pending continue Ozempic current dose and monitor.  Getting records from cardiology.  Follow up in 3months,30mins

## 2025-05-03 LAB
ALBUMIN SERPL-MCNC: 4.1 G/DL (ref 3.6–5.1)
ALP SERPL-CCNC: 35 U/L (ref 35–144)
ALT SERPL-CCNC: 15 U/L (ref 9–46)
ANION GAP SERPL CALCULATED.4IONS-SCNC: 7 MMOL/L (CALC) (ref 7–17)
APPEARANCE UR: CLEAR
AST SERPL-CCNC: 12 U/L (ref 10–35)
BACTERIA #/AREA URNS HPF: ABNORMAL /HPF
BACTERIA UR CULT: ABNORMAL
BILIRUB SERPL-MCNC: 0.4 MG/DL (ref 0.2–1.2)
BILIRUB UR QL STRIP: NEGATIVE
BUN SERPL-MCNC: 22 MG/DL (ref 7–25)
CALCIUM SERPL-MCNC: 9.3 MG/DL (ref 8.6–10.3)
CHLORIDE SERPL-SCNC: 104 MMOL/L (ref 98–110)
CHOLEST SERPL-MCNC: 139 MG/DL
CHOLEST/HDLC SERPL: 3.9 (CALC)
CO2 SERPL-SCNC: 28 MMOL/L (ref 20–32)
COLOR UR: YELLOW
CREAT SERPL-MCNC: 0.8 MG/DL (ref 0.7–1.35)
EGFRCR SERPLBLD CKD-EPI 2021: 99 ML/MIN/1.73M2
ERYTHROCYTE [DISTWIDTH] IN BLOOD BY AUTOMATED COUNT: 13 % (ref 11–15)
EST. AVERAGE GLUCOSE BLD GHB EST-MCNC: 174 MG/DL
EST. AVERAGE GLUCOSE BLD GHB EST-SCNC: 9.7 MMOL/L
GLUCOSE SERPL-MCNC: 163 MG/DL (ref 65–99)
GLUCOSE UR QL STRIP: ABNORMAL
HBA1C MFR BLD: 7.7 %
HCT VFR BLD AUTO: 48.3 % (ref 38.5–50)
HDLC SERPL-MCNC: 36 MG/DL
HGB BLD-MCNC: 15.9 G/DL (ref 13.2–17.1)
HGB UR QL STRIP: NEGATIVE
HYALINE CASTS #/AREA URNS LPF: ABNORMAL /LPF
KETONES UR QL STRIP: NEGATIVE
LDLC SERPL CALC-MCNC: 73 MG/DL (CALC)
LEUKOCYTE ESTERASE UR QL STRIP: NEGATIVE
MCH RBC QN AUTO: 30.2 PG (ref 27–33)
MCHC RBC AUTO-ENTMCNC: 32.9 G/DL (ref 32–36)
MCV RBC AUTO: 91.8 FL (ref 80–100)
NITRITE UR QL STRIP: NEGATIVE
NONHDLC SERPL-MCNC: 103 MG/DL (CALC)
PH UR STRIP: 6 [PH] (ref 5–8)
PLATELET # BLD AUTO: 122 THOUSAND/UL (ref 140–400)
PMV BLD REES-ECKER: 13.2 FL (ref 7.5–12.5)
POTASSIUM SERPL-SCNC: 4.5 MMOL/L (ref 3.5–5.3)
PROT SERPL-MCNC: 6.4 G/DL (ref 6.1–8.1)
PROT UR QL STRIP: NEGATIVE
PSA SERPL-MCNC: 0.65 NG/ML
RBC # BLD AUTO: 5.26 MILLION/UL (ref 4.2–5.8)
RBC #/AREA URNS HPF: ABNORMAL /HPF
SERVICE CMNT-IMP: ABNORMAL
SODIUM SERPL-SCNC: 139 MMOL/L (ref 135–146)
SP GR UR STRIP: 1.04 (ref 1–1.03)
SQUAMOUS #/AREA URNS HPF: ABNORMAL /HPF
TRIGL SERPL-MCNC: 198 MG/DL
TSH SERPL-ACNC: 1.73 MIU/L (ref 0.4–4.5)
WBC # BLD AUTO: 7.2 THOUSAND/UL (ref 3.8–10.8)
WBC #/AREA URNS HPF: ABNORMAL /HPF

## 2025-06-25 ENCOUNTER — APPOINTMENT (OUTPATIENT)
Dept: GASTROENTEROLOGY | Facility: HOSPITAL | Age: 65
End: 2025-06-25
Payer: COMMERCIAL

## 2025-07-24 DIAGNOSIS — E78.5 DM TYPE 2 WITH DIABETIC DYSLIPIDEMIA (MULTI): ICD-10-CM

## 2025-07-24 DIAGNOSIS — E11.69 DM TYPE 2 WITH DIABETIC DYSLIPIDEMIA (MULTI): ICD-10-CM

## 2025-07-24 DIAGNOSIS — E78.2 MIXED DYSLIPIDEMIA: ICD-10-CM

## 2025-07-24 DIAGNOSIS — K21.9 GASTROESOPHAGEAL REFLUX DISEASE WITHOUT ESOPHAGITIS: ICD-10-CM

## 2025-07-24 DIAGNOSIS — I10 HTN (HYPERTENSION), BENIGN: ICD-10-CM

## 2025-07-29 RX ORDER — METFORMIN HYDROCHLORIDE 500 MG/1
TABLET, EXTENDED RELEASE ORAL
Qty: 360 TABLET | Refills: 0 | Status: SHIPPED | OUTPATIENT
Start: 2025-07-29

## 2025-07-29 RX ORDER — OMEPRAZOLE 40 MG/1
CAPSULE, DELAYED RELEASE ORAL
Qty: 90 CAPSULE | Refills: 0 | Status: SHIPPED | OUTPATIENT
Start: 2025-07-29

## 2025-07-29 RX ORDER — LOSARTAN POTASSIUM 50 MG/1
50 TABLET ORAL DAILY
Qty: 90 TABLET | Refills: 0 | Status: SHIPPED | OUTPATIENT
Start: 2025-07-29

## 2025-07-29 RX ORDER — ROSUVASTATIN CALCIUM 20 MG/1
20 TABLET, COATED ORAL DAILY
Qty: 90 TABLET | Refills: 0 | Status: SHIPPED | OUTPATIENT
Start: 2025-07-29

## 2025-07-29 RX ORDER — EMPAGLIFLOZIN 25 MG/1
25 TABLET, FILM COATED ORAL DAILY
Qty: 90 TABLET | Refills: 0 | Status: SHIPPED | OUTPATIENT
Start: 2025-07-29

## 2025-07-29 RX ORDER — METOPROLOL TARTRATE 100 MG/1
100 TABLET ORAL 2 TIMES DAILY
Qty: 180 TABLET | Refills: 0 | Status: SHIPPED | OUTPATIENT
Start: 2025-07-29

## 2025-08-04 ENCOUNTER — TELEPHONE (OUTPATIENT)
Dept: PRIMARY CARE | Facility: CLINIC | Age: 65
End: 2025-08-04
Payer: COMMERCIAL

## 2025-08-04 DIAGNOSIS — E78.5 DM TYPE 2 WITH DIABETIC DYSLIPIDEMIA (MULTI): ICD-10-CM

## 2025-08-04 DIAGNOSIS — E11.69 DM TYPE 2 WITH DIABETIC DYSLIPIDEMIA (MULTI): ICD-10-CM

## 2025-08-04 RX ORDER — SEMAGLUTIDE 0.68 MG/ML
0.5 INJECTION, SOLUTION SUBCUTANEOUS
Qty: 9 ML | Refills: 0 | Status: SHIPPED | OUTPATIENT
Start: 2025-08-04

## 2025-08-04 NOTE — TELEPHONE ENCOUNTER
Medication N agnes: SEMIGLUTIDE   Dose:  Frequency:  Quantity left:  Pharmacy: [facility]    Last appointment:5/2/25  Last CPE: 5/2/25  Last MCW:  Next appointment: 8/26/25  Next CPE:  Next MCW:

## 2025-08-05 ENCOUNTER — OFFICE VISIT (OUTPATIENT)
Dept: URGENT CARE | Age: 65
End: 2025-08-05
Payer: COMMERCIAL

## 2025-08-05 ENCOUNTER — APPOINTMENT (OUTPATIENT)
Dept: PRIMARY CARE | Facility: CLINIC | Age: 65
End: 2025-08-05
Payer: COMMERCIAL

## 2025-08-05 DIAGNOSIS — J01.00 ACUTE MAXILLARY SINUSITIS, RECURRENCE NOT SPECIFIED: Primary | ICD-10-CM

## 2025-08-05 PROCEDURE — 99202 OFFICE O/P NEW SF 15 MIN: CPT | Performed by: PHYSICIAN ASSISTANT

## 2025-08-05 PROCEDURE — 4010F ACE/ARB THERAPY RXD/TAKEN: CPT | Performed by: PHYSICIAN ASSISTANT

## 2025-08-05 RX ORDER — AMOXICILLIN AND CLAVULANATE POTASSIUM 875; 125 MG/1; MG/1
875 TABLET, FILM COATED ORAL 2 TIMES DAILY
Qty: 20 TABLET | Refills: 0 | Status: SHIPPED | OUTPATIENT
Start: 2025-08-05

## 2025-08-05 RX ORDER — FLUTICASONE PROPIONATE 50 MCG
1 SPRAY, SUSPENSION (ML) NASAL 2 TIMES DAILY
Qty: 16 G | Refills: 0 | Status: SHIPPED | OUTPATIENT
Start: 2025-08-05 | End: 2025-08-15

## 2025-08-05 NOTE — PROGRESS NOTES
"Urgent Care Virtual Video Visit    Patient Location: [facility]  Provider Location: [facility]    65-year-old male with past medical history for hypertension hyperlipidemia and diabetes has had slight postnasal drip sinus pressure/pain and slight nonproductive cough symptoms started a week ago \"patient had initially thought it was secondary to allergies\" but not improving with over-the-counter medications    I spoke to patient on virtual visit,   no fevers chills no chest pain no lightheaded or dizziness no nausea vomiting diarrhea  Positive maxillary and frontal sinus pain with palp positive nasal discharge and postnasal drip  Symptoms now for 7-8 days without improvement  Similar symptoms in the past    Will prescribe an oral antibiotic and steroid nasal spray  Sent both prescription to his pharmacy ([facility]) on [address] in [facility]      Stressed to the patient that if he develops any fevers chest pain worsening symptoms he would need to be seen in person  Patient verbalized understanding and is agreeable to the current plan      I have communicated my name and active licensure. Video visit completed with realtime synchronous video/audio connection. Informed consent was obtained from the patient. Patient was made aware that my evaluation and diagnosis are limited due to the fact that we are not in the same room during the interview and that this is a virtual encounter that took place via videoconferencing. Patient verbalized understanding.     Patient disposition: Home    Electronically signed by Julia Beth PA-C  1:17 PM    "

## 2025-08-11 ENCOUNTER — APPOINTMENT (OUTPATIENT)
Dept: PRIMARY CARE | Facility: CLINIC | Age: 65
End: 2025-08-11
Payer: COMMERCIAL

## 2025-08-26 ENCOUNTER — APPOINTMENT (OUTPATIENT)
Dept: PRIMARY CARE | Facility: CLINIC | Age: 65
End: 2025-08-26
Payer: COMMERCIAL

## 2025-08-26 VITALS
DIASTOLIC BLOOD PRESSURE: 78 MMHG | WEIGHT: 223 LBS | OXYGEN SATURATION: 99 % | HEIGHT: 73 IN | BODY MASS INDEX: 29.55 KG/M2 | HEART RATE: 51 BPM | RESPIRATION RATE: 16 BRPM | SYSTOLIC BLOOD PRESSURE: 136 MMHG

## 2025-08-26 DIAGNOSIS — I25.10 CORONARY ARTERY DISEASE INVOLVING NATIVE CORONARY ARTERY OF NATIVE HEART WITHOUT ANGINA PECTORIS: ICD-10-CM

## 2025-08-26 DIAGNOSIS — G47.33 OSA (OBSTRUCTIVE SLEEP APNEA): ICD-10-CM

## 2025-08-26 DIAGNOSIS — E78.5 DM TYPE 2 WITH DIABETIC DYSLIPIDEMIA (MULTI): Primary | ICD-10-CM

## 2025-08-26 DIAGNOSIS — I10 HTN (HYPERTENSION), BENIGN: ICD-10-CM

## 2025-08-26 DIAGNOSIS — K21.9 GASTROESOPHAGEAL REFLUX DISEASE WITHOUT ESOPHAGITIS: ICD-10-CM

## 2025-08-26 DIAGNOSIS — E11.69 DM TYPE 2 WITH DIABETIC DYSLIPIDEMIA (MULTI): Primary | ICD-10-CM

## 2025-08-26 DIAGNOSIS — E78.2 MIXED DYSLIPIDEMIA: ICD-10-CM

## 2025-08-26 LAB
ALBUMIN SERPL-MCNC: 4.3 G/DL (ref 3.6–5.1)
ALBUMIN/CREAT UR: 11 MG/G CREAT
ALBUMIN/GLOB SERPL: 1.8 (CALC) (ref 1–2.5)
ALP SERPL-CCNC: 37 U/L (ref 35–144)
ALT SERPL-CCNC: 16 U/L (ref 9–46)
APPEARANCE UR: CLEAR
AST SERPL-CCNC: 14 U/L (ref 10–35)
BACTERIA #/AREA URNS HPF: ABNORMAL /HPF
BACTERIA UR CULT: ABNORMAL
BASOPHILS # BLD AUTO: 28 CELLS/UL (ref 0–200)
BASOPHILS NFR BLD AUTO: 0.4 %
BILIRUB SERPL-MCNC: 0.4 MG/DL (ref 0.2–1.2)
BILIRUB UR QL STRIP: NEGATIVE
BUN SERPL-MCNC: 15 MG/DL (ref 7–25)
BUN/CREAT SERPL: ABNORMAL (CALC) (ref 6–22)
CALCIUM SERPL-MCNC: 9.2 MG/DL (ref 8.6–10.3)
CHLORIDE SERPL-SCNC: 102 MMOL/L (ref 98–110)
CHOLEST SERPL-MCNC: 120 MG/DL
CHOLEST/HDLC SERPL: 2.9 (CALC)
CO2 SERPL-SCNC: 26 MMOL/L (ref 20–32)
COLOR UR: YELLOW
CREAT SERPL-MCNC: 0.87 MG/DL (ref 0.7–1.35)
CREAT UR-MCNC: 82 MG/DL (ref 20–320)
EGFRCR SERPLBLD CKD-EPI 2021: 96 ML/MIN/1.73M2
EOSINOPHIL # BLD AUTO: 98 CELLS/UL (ref 15–500)
EOSINOPHIL NFR BLD AUTO: 1.4 %
ERYTHROCYTE [DISTWIDTH] IN BLOOD BY AUTOMATED COUNT: 12.6 % (ref 11–15)
GLOBULIN SER CALC-MCNC: 2.4 G/DL (CALC) (ref 1.9–3.7)
GLUCOSE SERPL-MCNC: 132 MG/DL (ref 65–99)
GLUCOSE UR QL STRIP: ABNORMAL
HBA1C MFR BLD: 7.9 %
HCT VFR BLD AUTO: 49.8 % (ref 38.5–50)
HDLC SERPL-MCNC: 41 MG/DL
HGB BLD-MCNC: 16.2 G/DL (ref 13.2–17.1)
HGB UR QL STRIP: NEGATIVE
HYALINE CASTS #/AREA URNS LPF: ABNORMAL /LPF
KETONES UR QL STRIP: NEGATIVE
LDLC SERPL CALC-MCNC: 66 MG/DL (CALC)
LEUKOCYTE ESTERASE UR QL STRIP: NEGATIVE
LYMPHOCYTES # BLD AUTO: 2359 CELLS/UL (ref 850–3900)
LYMPHOCYTES NFR BLD AUTO: 33.7 %
MCH RBC QN AUTO: 30.5 PG (ref 27–33)
MCHC RBC AUTO-ENTMCNC: 32.5 G/DL (ref 32–36)
MCV RBC AUTO: 93.8 FL (ref 80–100)
MICROALBUMIN UR-MCNC: 0.9 MG/DL
MONOCYTES # BLD AUTO: 609 CELLS/UL (ref 200–950)
MONOCYTES NFR BLD AUTO: 8.7 %
NEUTROPHILS # BLD AUTO: 3906 CELLS/UL (ref 1500–7800)
NEUTROPHILS NFR BLD AUTO: 55.8 %
NITRITE UR QL STRIP: NEGATIVE
NONHDLC SERPL-MCNC: 79 MG/DL (CALC)
PH UR STRIP: ABNORMAL [PH] (ref 5–8)
PLATELET # BLD AUTO: 132 THOUSAND/UL (ref 140–400)
PMV BLD REES-ECKER: 12.8 FL (ref 7.5–12.5)
POTASSIUM SERPL-SCNC: 4.8 MMOL/L (ref 3.5–5.3)
PROT SERPL-MCNC: 6.7 G/DL (ref 6.1–8.1)
PROT UR QL STRIP: NEGATIVE
PSA SERPL-MCNC: 0.75 NG/ML
RBC # BLD AUTO: 5.31 MILLION/UL (ref 4.2–5.8)
RBC #/AREA URNS HPF: ABNORMAL /HPF
SERVICE CMNT-IMP: ABNORMAL
SODIUM SERPL-SCNC: 138 MMOL/L (ref 135–146)
SP GR UR STRIP: 1.03 (ref 1–1.03)
SQUAMOUS #/AREA URNS HPF: ABNORMAL /HPF
TRIGL SERPL-MCNC: 58 MG/DL
TSH SERPL-ACNC: 1.71 MIU/L (ref 0.4–4.5)
WBC # BLD AUTO: 7 THOUSAND/UL (ref 3.8–10.8)
WBC #/AREA URNS HPF: ABNORMAL /HPF

## 2025-08-26 PROCEDURE — 3078F DIAST BP <80 MM HG: CPT | Performed by: FAMILY MEDICINE

## 2025-08-26 PROCEDURE — 1036F TOBACCO NON-USER: CPT | Performed by: FAMILY MEDICINE

## 2025-08-26 PROCEDURE — 4010F ACE/ARB THERAPY RXD/TAKEN: CPT | Performed by: FAMILY MEDICINE

## 2025-08-26 PROCEDURE — 3008F BODY MASS INDEX DOCD: CPT | Performed by: FAMILY MEDICINE

## 2025-08-26 PROCEDURE — 3075F SYST BP GE 130 - 139MM HG: CPT | Performed by: FAMILY MEDICINE

## 2025-08-26 PROCEDURE — 99214 OFFICE O/P EST MOD 30 MIN: CPT | Performed by: FAMILY MEDICINE

## 2025-08-26 RX ORDER — ROSUVASTATIN CALCIUM 20 MG/1
20 TABLET, COATED ORAL DAILY
Qty: 90 TABLET | Refills: 0 | Status: SHIPPED | OUTPATIENT
Start: 2025-08-26

## 2025-08-26 RX ORDER — EMPAGLIFLOZIN 25 MG/1
25 TABLET, FILM COATED ORAL DAILY
Qty: 90 TABLET | Refills: 0 | Status: SHIPPED | OUTPATIENT
Start: 2025-08-26

## 2025-08-26 RX ORDER — LOSARTAN POTASSIUM 50 MG/1
50 TABLET ORAL DAILY
Qty: 90 TABLET | Refills: 0 | Status: SHIPPED | OUTPATIENT
Start: 2025-08-26

## 2025-08-26 RX ORDER — SEMAGLUTIDE 0.68 MG/ML
0.5 INJECTION, SOLUTION SUBCUTANEOUS
Qty: 9 ML | Refills: 0 | Status: SHIPPED | OUTPATIENT
Start: 2025-08-26

## 2025-08-26 RX ORDER — OMEPRAZOLE 40 MG/1
40 CAPSULE, DELAYED RELEASE ORAL
Qty: 90 CAPSULE | Refills: 0 | Status: SHIPPED | OUTPATIENT
Start: 2025-08-26

## 2025-08-26 RX ORDER — METFORMIN HYDROCHLORIDE 500 MG/1
TABLET, EXTENDED RELEASE ORAL
Qty: 360 TABLET | Refills: 0 | Status: SHIPPED | OUTPATIENT
Start: 2025-08-26

## 2025-08-26 RX ORDER — METOPROLOL TARTRATE 100 MG/1
100 TABLET ORAL 2 TIMES DAILY
Qty: 180 TABLET | Refills: 0 | Status: SHIPPED | OUTPATIENT
Start: 2025-08-26

## 2025-08-26 ASSESSMENT — ENCOUNTER SYMPTOMS
CHILLS: 0
HEADACHES: 0
DYSURIA: 0
DIFFICULTY URINATING: 0
SHORTNESS OF BREATH: 0
BRUISES/BLEEDS EASILY: 0
WEAKNESS: 0
APPETITE CHANGE: 0
ABDOMINAL PAIN: 0
ABDOMINAL DISTENTION: 0
ARTHRALGIAS: 0
BACK PAIN: 0
ADENOPATHY: 0
EYE REDNESS: 0
BLOOD IN STOOL: 0
FEVER: 0
CONSTIPATION: 0
CHEST TIGHTNESS: 0
DIARRHEA: 0
DYSPHORIC MOOD: 0
EYE PAIN: 0
NERVOUS/ANXIOUS: 0
COUGH: 0
FATIGUE: 0
DIZZINESS: 0
SORE THROAT: 0

## 2025-08-26 ASSESSMENT — PATIENT HEALTH QUESTIONNAIRE - PHQ9
SUM OF ALL RESPONSES TO PHQ9 QUESTIONS 1 AND 2: 0
1. LITTLE INTEREST OR PLEASURE IN DOING THINGS: NOT AT ALL
2. FEELING DOWN, DEPRESSED OR HOPELESS: NOT AT ALL

## 2025-09-04 ENCOUNTER — HOSPITAL ENCOUNTER (OUTPATIENT)
Dept: GASTROENTEROLOGY | Facility: HOSPITAL | Age: 65
Discharge: HOME | End: 2025-09-04
Payer: COMMERCIAL

## 2025-09-04 VITALS
DIASTOLIC BLOOD PRESSURE: 60 MMHG | SYSTOLIC BLOOD PRESSURE: 101 MMHG | RESPIRATION RATE: 20 BRPM | TEMPERATURE: 97.5 F | HEART RATE: 63 BPM | BODY MASS INDEX: 28.63 KG/M2 | OXYGEN SATURATION: 94 % | WEIGHT: 216 LBS | HEIGHT: 73 IN

## 2025-09-04 DIAGNOSIS — K92.1 BLOOD IN STOOL: ICD-10-CM

## 2025-09-04 LAB — GLUCOSE BLD MANUAL STRIP-MCNC: 102 MG/DL (ref 74–99)

## 2025-09-04 PROCEDURE — 7100000010 HC PHASE TWO TIME - EACH INCREMENTAL 1 MINUTE

## 2025-09-04 PROCEDURE — 2500000004 HC RX 250 GENERAL PHARMACY W/ HCPCS (ALT 636 FOR OP/ED): Performed by: INTERNAL MEDICINE

## 2025-09-04 PROCEDURE — 82947 ASSAY GLUCOSE BLOOD QUANT: CPT

## 2025-09-04 PROCEDURE — 43235 EGD DIAGNOSTIC BRUSH WASH: CPT | Performed by: INTERNAL MEDICINE

## 2025-09-04 PROCEDURE — 3700000012 HC SEDATION LEVEL 5+ TIME - INITIAL 15 MINUTES 5/> YEARS

## 2025-09-04 PROCEDURE — 45378 DIAGNOSTIC COLONOSCOPY: CPT | Performed by: INTERNAL MEDICINE

## 2025-09-04 PROCEDURE — 7100000009 HC PHASE TWO TIME - INITIAL BASE CHARGE

## 2025-09-04 RX ORDER — MIDAZOLAM HYDROCHLORIDE 1 MG/ML
INJECTION, SOLUTION INTRAMUSCULAR; INTRAVENOUS AS NEEDED
Status: COMPLETED | OUTPATIENT
Start: 2025-09-04 | End: 2025-09-04

## 2025-09-04 RX ORDER — MEPERIDINE HYDROCHLORIDE 50 MG/ML
INJECTION INTRAMUSCULAR; INTRAVENOUS; SUBCUTANEOUS AS NEEDED
Status: COMPLETED | OUTPATIENT
Start: 2025-09-04 | End: 2025-09-04

## 2025-09-04 RX ADMIN — MEPERIDINE HYDROCHLORIDE 25 MG: 50 INJECTION INTRAMUSCULAR; INTRAVENOUS; SUBCUTANEOUS at 13:40

## 2025-09-04 RX ADMIN — MIDAZOLAM 2 MG: 1 INJECTION INTRAMUSCULAR; INTRAVENOUS at 13:35

## 2025-09-04 RX ADMIN — MIDAZOLAM 1 MG: 1 INJECTION INTRAMUSCULAR; INTRAVENOUS at 13:40

## 2025-09-04 RX ADMIN — MEPERIDINE HYDROCHLORIDE 25 MG: 50 INJECTION INTRAMUSCULAR; INTRAVENOUS; SUBCUTANEOUS at 13:43

## 2025-09-04 RX ADMIN — MIDAZOLAM 1 MG: 1 INJECTION INTRAMUSCULAR; INTRAVENOUS at 13:43

## 2025-09-04 RX ADMIN — MEPERIDINE HYDROCHLORIDE 50 MG: 50 INJECTION INTRAMUSCULAR; INTRAVENOUS; SUBCUTANEOUS at 13:35

## 2025-09-04 ASSESSMENT — PAIN SCALES - GENERAL
PAINLEVEL_OUTOF10: 0 - NO PAIN

## 2025-09-04 ASSESSMENT — PAIN - FUNCTIONAL ASSESSMENT
PAIN_FUNCTIONAL_ASSESSMENT: UNABLE TO SELF-REPORT
PAIN_FUNCTIONAL_ASSESSMENT: UNABLE TO SELF-REPORT
PAIN_FUNCTIONAL_ASSESSMENT: 0-10
PAIN_FUNCTIONAL_ASSESSMENT: UNABLE TO SELF-REPORT
PAIN_FUNCTIONAL_ASSESSMENT: 0-10
PAIN_FUNCTIONAL_ASSESSMENT: 0-10

## 2025-12-08 ENCOUNTER — APPOINTMENT (OUTPATIENT)
Dept: PRIMARY CARE | Facility: CLINIC | Age: 65
End: 2025-12-08
Payer: COMMERCIAL

## 2026-01-15 ENCOUNTER — APPOINTMENT (OUTPATIENT)
Dept: SLEEP MEDICINE | Facility: CLINIC | Age: 66
End: 2026-01-15
Payer: COMMERCIAL